# Patient Record
Sex: MALE | Race: WHITE | NOT HISPANIC OR LATINO | Employment: OTHER | URBAN - METROPOLITAN AREA
[De-identification: names, ages, dates, MRNs, and addresses within clinical notes are randomized per-mention and may not be internally consistent; named-entity substitution may affect disease eponyms.]

---

## 2023-01-31 ENCOUNTER — OFFICE VISIT (OUTPATIENT)
Dept: HEPATOLOGY | Facility: CLINIC | Age: 43
End: 2023-01-31
Payer: MEDICAID

## 2023-01-31 ENCOUNTER — TELEPHONE (OUTPATIENT)
Dept: HEPATOLOGY | Facility: CLINIC | Age: 43
End: 2023-01-31

## 2023-01-31 VITALS
WEIGHT: 230.38 LBS | OXYGEN SATURATION: 97 % | RESPIRATION RATE: 18 BRPM | DIASTOLIC BLOOD PRESSURE: 72 MMHG | SYSTOLIC BLOOD PRESSURE: 135 MMHG | HEART RATE: 68 BPM | HEIGHT: 68 IN | BODY MASS INDEX: 34.92 KG/M2

## 2023-01-31 DIAGNOSIS — R76.8 HEPATITIS C ANTIBODY TEST POSITIVE: ICD-10-CM

## 2023-01-31 DIAGNOSIS — R79.89 ELEVATED LIVER FUNCTION TESTS: ICD-10-CM

## 2023-01-31 DIAGNOSIS — K74.60 HEPATIC CIRRHOSIS, UNSPECIFIED HEPATIC CIRRHOSIS TYPE, UNSPECIFIED WHETHER ASCITES PRESENT: Primary | ICD-10-CM

## 2023-01-31 DIAGNOSIS — R76.8 HEPATITIS C ANTIBODY TEST POSITIVE: Primary | ICD-10-CM

## 2023-01-31 DIAGNOSIS — K74.69 OTHER CIRRHOSIS OF LIVER: ICD-10-CM

## 2023-01-31 PROCEDURE — 3078F PR MOST RECENT DIASTOLIC BLOOD PRESSURE < 80 MM HG: ICD-10-PCS | Mod: CPTII,,, | Performed by: INTERNAL MEDICINE

## 2023-01-31 PROCEDURE — 1159F MED LIST DOCD IN RCRD: CPT | Mod: CPTII,,, | Performed by: INTERNAL MEDICINE

## 2023-01-31 PROCEDURE — 1159F PR MEDICATION LIST DOCUMENTED IN MEDICAL RECORD: ICD-10-PCS | Mod: CPTII,,, | Performed by: INTERNAL MEDICINE

## 2023-01-31 PROCEDURE — 3008F BODY MASS INDEX DOCD: CPT | Mod: CPTII,,, | Performed by: INTERNAL MEDICINE

## 2023-01-31 PROCEDURE — 3075F PR MOST RECENT SYSTOLIC BLOOD PRESS GE 130-139MM HG: ICD-10-PCS | Mod: CPTII,,, | Performed by: INTERNAL MEDICINE

## 2023-01-31 PROCEDURE — 99999 PR PBB SHADOW E&M-EST. PATIENT-LVL IV: ICD-10-PCS | Mod: PBBFAC,,, | Performed by: INTERNAL MEDICINE

## 2023-01-31 PROCEDURE — 99999 PR PBB SHADOW E&M-EST. PATIENT-LVL IV: CPT | Mod: PBBFAC,,, | Performed by: INTERNAL MEDICINE

## 2023-01-31 PROCEDURE — 3008F PR BODY MASS INDEX (BMI) DOCUMENTED: ICD-10-PCS | Mod: CPTII,,, | Performed by: INTERNAL MEDICINE

## 2023-01-31 PROCEDURE — 3078F DIAST BP <80 MM HG: CPT | Mod: CPTII,,, | Performed by: INTERNAL MEDICINE

## 2023-01-31 PROCEDURE — 3075F SYST BP GE 130 - 139MM HG: CPT | Mod: CPTII,,, | Performed by: INTERNAL MEDICINE

## 2023-01-31 PROCEDURE — 99204 PR OFFICE/OUTPT VISIT, NEW, LEVL IV, 45-59 MIN: ICD-10-PCS | Mod: S$PBB,,, | Performed by: INTERNAL MEDICINE

## 2023-01-31 PROCEDURE — 99214 OFFICE O/P EST MOD 30 MIN: CPT | Mod: PBBFAC,PN | Performed by: INTERNAL MEDICINE

## 2023-01-31 PROCEDURE — 99204 OFFICE O/P NEW MOD 45 MIN: CPT | Mod: S$PBB,,, | Performed by: INTERNAL MEDICINE

## 2023-01-31 NOTE — PATIENT INSTRUCTIONS
Labs today  Abdo US  Fibroscan  Appt in HCV clinic  Return after treatment for Hepatitis C- 4 months  EGD

## 2023-01-31 NOTE — PROGRESS NOTES
HEPATOLOGY CONSULTATION    Referring Physician: Self-referral  Current Corresponding Physician: Blanco Hernandez MD PhD     Reason for Consultation: Consultation for evaluation of Elevated Hepatic Enzymes    History of Present Illness: Paul Scott is a 42 y.o. male who was seen in the ER 07/22 and was noted to have elevated liver tests. 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; has 5 children and takes care of them (kids mother also clean but pt has children); also has grandchildren  --intermittent lower leg swelling  --no memory issues  --+tattoos  --poor dentition- needs extractions    Chief Complaint   Patient presents with    Elevated Hepatic Enzymes       Past Medical History:   Diagnosis Date    Asthma     Elevated liver function tests 1/31/2023    Hepatitis C antibody test positive 1/31/2023     Outpatient Encounter Medications as of 1/31/2023   Medication Sig Dispense Refill    HYDROcodone-acetaminophen (NORCO) 5-325 mg per tablet Take 1 tablet by mouth every 4 (four) hours as needed. 10 tablet 0    ketorolac (TORADOL) 10 mg tablet Take 1 tablet (10 mg total) by mouth every 6 (six) hours. 20 tablet 0    methadone (METHADOSE) 40 mg disintegrating tablet Take 100 mg by mouth once daily.        No facility-administered encounter medications on file as of 1/31/2023.     Review of patient's allergies indicates:  No Known Allergies  History reviewed. No pertinent family history.    Social History     Socioeconomic History    Marital status: Single   Tobacco Use    Smoking status: Heavy Smoker     Packs/day: 1.00     Types: Cigarettes    Smokeless tobacco: Never   Substance and  Sexual Activity    Alcohol use: Not Currently    Drug use: Not Currently     Review of Systems   Constitutional: Negative.    HENT: Negative.     Eyes: Negative.    Respiratory: Negative.     Cardiovascular: Negative.    Gastrointestinal: Negative.    Genitourinary: Negative.    Musculoskeletal: Negative.    Skin: Negative.    Neurological: Negative.    Psychiatric/Behavioral: Negative.       Vitals:    01/31/23 1253   BP: 135/72   Pulse: 68   Resp: 18        Physical Exam  Vitals reviewed.   Constitutional:       Appearance: He is well-developed.   HENT:      Head: Normocephalic and atraumatic.   Eyes:      General: No scleral icterus.     Conjunctiva/sclera: Conjunctivae normal.      Pupils: Pupils are equal, round, and reactive to light.   Neck:      Thyroid: No thyromegaly.   Cardiovascular:      Rate and Rhythm: Normal rate and regular rhythm.      Heart sounds: Normal heart sounds.   Pulmonary:      Effort: Pulmonary effort is normal.      Breath sounds: Normal breath sounds. No rales.   Abdominal:      General: Bowel sounds are normal. There is no distension.      Palpations: Abdomen is soft. There is no mass.      Tenderness: There is no abdominal tenderness.   Musculoskeletal:         General: Normal range of motion.      Cervical back: Normal range of motion and neck supple.   Skin:     General: Skin is warm and dry.      Findings: No rash.      Comments: +spider angiomata   Neurological:      Mental Status: He is alert and oriented to person, place, and time.       Computed MELD-Na score unavailable. Necessary lab results were not found in the last year.  Computed MELD score unavailable. Necessary lab results were not found in the last year.    Lab Results   Component Value Date     07/16/2022    BUN 11 07/16/2022    CREATININE 0.8 07/16/2022    CALCIUM 9.0 07/16/2022     07/16/2022    K 3.8 07/16/2022     07/16/2022    PROT 8.5 (H) 07/16/2022    CO2 24 07/16/2022    ANIONGAP 9  "07/16/2022    WBC 9.89 07/16/2022    RBC 4.20 (L) 07/16/2022    HGB 13.5 (L) 07/16/2022    HCT 39.8 (L) 07/16/2022    MCV 95 07/16/2022    MCH 32.1 (H) 07/16/2022    MCHC 33.9 07/16/2022     Lab Results   Component Value Date    RDW 13.1 07/16/2022     07/16/2022    MPV 12.5 07/16/2022    GRAN 5.1 07/16/2022    GRAN 51.4 07/16/2022    LYMPH 3.4 07/16/2022    LYMPH 34.6 07/16/2022    MONO 0.7 07/16/2022    MONO 7.1 07/16/2022    EOSINOPHIL 5.9 07/16/2022    BASOPHIL 0.9 07/16/2022    EOS 0.6 (H) 07/16/2022    BASO 0.09 07/16/2022    ALBUMIN 3.6 07/16/2022     (H) 07/16/2022     (H) 07/16/2022    ALKPHOS 190 (H) 07/16/2022       Assessment and Plan:  Paul Scott is a 42 y.o. male with a positive Hepatitis C antibody and Elevated Hepatic Enzymes  Given the history of IVDA I suspect that is how he acquired HCV. I suspect he is viremic since his liver tests are elevated. I will check for viremia and will check a genotype. If chronic HCV confirmed will refer for treatment.  Need to clarify his HBV status. Current recommendations:  HCV Ab+: check HCV VL and genotype; if viremic then refer for tx  Spider angiomata: suspect cirrhosis: fibroscan to confirm; EGD to r/o varices  Elevated liver tests" likely from HCV: recommend full serologic w/u  Poor dentition- dental extractions recommended    Return 4 months after HCV treatment    "

## 2023-01-31 NOTE — TELEPHONE ENCOUNTER
Called [facility] [employer] Per Dr. Chavarria. He was in need of a PCP. The one that was on his chart does not take is current ins.   Lily with rod states he can see an NP Gala Balderas [address] [place]. [phone]  His new ins. Cards are on the way. Effective 2/1/2023. Ref#[id number]  He was instructed to throw away the old cards once he receives the new ones.     Call to Mrs. Gala Balderas NP at [facility] to get Mr. Scott an appt  Spoke with yadiel and he is scheduled for 2/15/2023 at 1 pm has to go in at 1245 with ins cards and ID and medications

## 2023-02-06 ENCOUNTER — TELEPHONE (OUTPATIENT)
Dept: HEPATOLOGY | Facility: CLINIC | Age: 43
End: 2023-02-06
Payer: MEDICAID

## 2023-02-06 ENCOUNTER — PATIENT MESSAGE (OUTPATIENT)
Dept: HEPATOLOGY | Facility: CLINIC | Age: 43
End: 2023-02-06
Payer: MEDICAID

## 2023-02-06 NOTE — TELEPHONE ENCOUNTER
I spoke with patient's daughter.  Appt with PA Scheuermann scheduled 3/10/23; appt reminder notice mailed.

## 2023-02-06 NOTE — TELEPHONE ENCOUNTER
Call returned to the patient at  [phone]. Corrine stated he does not have a phone.  Corrine, daughter of the patient answered.  Corrine stated he does not have a phone.  She stated she sends the messages and keeps up with her father's appt.  Corrine wanted discuss the US results. I can't understand all the measurements.  Briefly explained enlarged liver, spleen and gall stones.    You need to come with him on March 10th at 2:20 pm. The Provider will go into more details with you and answer all of your questions.  Corrine voiced understanding.

## 2023-03-10 ENCOUNTER — OFFICE VISIT (OUTPATIENT)
Dept: HEPATOLOGY | Facility: CLINIC | Age: 43
End: 2023-03-10
Payer: MEDICAID

## 2023-03-10 ENCOUNTER — PROCEDURE VISIT (OUTPATIENT)
Dept: HEPATOLOGY | Facility: CLINIC | Age: 43
End: 2023-03-10
Payer: MEDICAID

## 2023-03-10 VITALS — HEIGHT: 68 IN | BODY MASS INDEX: 35.92 KG/M2 | WEIGHT: 237 LBS

## 2023-03-10 DIAGNOSIS — B18.2 CHRONIC HEPATITIS C WITHOUT HEPATIC COMA: ICD-10-CM

## 2023-03-10 DIAGNOSIS — R76.8 HEPATITIS C ANTIBODY TEST POSITIVE: ICD-10-CM

## 2023-03-10 DIAGNOSIS — K74.69 OTHER CIRRHOSIS OF LIVER: ICD-10-CM

## 2023-03-10 DIAGNOSIS — K86.89 PANCREATIC DUCT DILATED: Primary | ICD-10-CM

## 2023-03-10 DIAGNOSIS — K83.8 DILATED BILE DUCT: ICD-10-CM

## 2023-03-10 PROCEDURE — 1159F MED LIST DOCD IN RCRD: CPT | Mod: CPTII,,, | Performed by: PHYSICIAN ASSISTANT

## 2023-03-10 PROCEDURE — 1160F PR REVIEW ALL MEDS BY PRESCRIBER/CLIN PHARMACIST DOCUMENTED: ICD-10-PCS | Mod: CPTII,,, | Performed by: PHYSICIAN ASSISTANT

## 2023-03-10 PROCEDURE — 3008F BODY MASS INDEX DOCD: CPT | Mod: CPTII,,, | Performed by: PHYSICIAN ASSISTANT

## 2023-03-10 PROCEDURE — 91200 LIVER ELASTOGRAPHY: CPT | Mod: PBBFAC | Performed by: PHYSICIAN ASSISTANT

## 2023-03-10 PROCEDURE — 1160F RVW MEDS BY RX/DR IN RCRD: CPT | Mod: CPTII,,, | Performed by: PHYSICIAN ASSISTANT

## 2023-03-10 PROCEDURE — 91200 FIBROSCAN NEW ORLEANS: ICD-10-PCS | Mod: 26,S$PBB,, | Performed by: PHYSICIAN ASSISTANT

## 2023-03-10 PROCEDURE — 99215 OFFICE O/P EST HI 40 MIN: CPT | Mod: S$PBB,,, | Performed by: PHYSICIAN ASSISTANT

## 2023-03-10 PROCEDURE — 99999 PR PBB SHADOW E&M-EST. PATIENT-LVL III: ICD-10-PCS | Mod: PBBFAC,,, | Performed by: PHYSICIAN ASSISTANT

## 2023-03-10 PROCEDURE — 3008F PR BODY MASS INDEX (BMI) DOCUMENTED: ICD-10-PCS | Mod: CPTII,,, | Performed by: PHYSICIAN ASSISTANT

## 2023-03-10 PROCEDURE — 99215 PR OFFICE/OUTPT VISIT, EST, LEVL V, 40-54 MIN: ICD-10-PCS | Mod: S$PBB,,, | Performed by: PHYSICIAN ASSISTANT

## 2023-03-10 PROCEDURE — 99999 PR PBB SHADOW E&M-EST. PATIENT-LVL III: CPT | Mod: PBBFAC,,, | Performed by: PHYSICIAN ASSISTANT

## 2023-03-10 PROCEDURE — 99213 OFFICE O/P EST LOW 20 MIN: CPT | Mod: PBBFAC | Performed by: PHYSICIAN ASSISTANT

## 2023-03-10 PROCEDURE — 1159F PR MEDICATION LIST DOCUMENTED IN MEDICAL RECORD: ICD-10-PCS | Mod: CPTII,,, | Performed by: PHYSICIAN ASSISTANT

## 2023-03-10 RX ORDER — BUPROPION HYDROCHLORIDE 150 MG/1
TABLET ORAL
Status: ON HOLD | COMMUNITY
End: 2023-08-03 | Stop reason: CLARIF

## 2023-03-10 RX ORDER — VELPATASVIR AND SOFOSBUVIR 100; 400 MG/1; MG/1
1 TABLET, FILM COATED ORAL DAILY
Qty: 28 TABLET | Refills: 2 | Status: ON HOLD | OUTPATIENT
Start: 2023-03-10 | End: 2023-08-03 | Stop reason: CLARIF

## 2023-03-10 RX ORDER — CLINDAMYCIN HYDROCHLORIDE 300 MG/1
CAPSULE ORAL
COMMUNITY
End: 2023-03-21

## 2023-03-10 RX ORDER — NAPROXEN 500 MG/1
TABLET ORAL
COMMUNITY
End: 2023-03-21

## 2023-03-10 NOTE — PROCEDURES
FibroScan [place]    Date/Time: 3/10/2023 3:00 PM  Performed by: Jennifer B. Scheuermann, PA  Authorized by: Meche Chavarria MD     Diagnosis:  HCV    Probe:  M    Universal Protocol: Patient's identity, procedure and site were verified, confirmatory pause was performed.  Discussed procedure including risks and potential complications.  Questions answered.  Patient verbalizes understanding and wishes to proceed with VCTE.     Procedure: After providing explanations of the procedure, patient was placed in the supine position with right arm in maximum abduction to allow optimal exposure of right lateral abdomen.  Patient was briefly assessed, Testing was performed in the mid-axillary location, 50Hz Shear Wave pulses were applied and the resulting Shear Wave and Propagation Speed detected with a 3.5 MHz ultrasonic signal, using the FibroScan probe, Skin to liver capsule distance and liver parenchyma were accessed during the entire examination with the FibroScan probe, Patient was instructed to breathe normally and to abstain from sudden movements during the procedure, allowing for random measurements of liver stiffness. At least 10 Shear Waves were produced, Individual measurements of each Shear Wave were calculated.  Patient tolerated the procedure well with no complications.  Meets discharge criteria as was dismissed.  Rates pain 0 out of 10.  Patient will follow up with ordering provider to review results.    Findings  Median liver stiffness score:  46.5  CAP Reading: dB/m:  223    IQR/med %:  14  Interpretation  Fibrosis interpretation is based on medial liver stiffness - Kilopascal (kPa).    Fibrosis Stage:  F4  Steatosis interpretation is based on controlled attenuation parameter - (dB/m).    Steatosis Grade:  <S1

## 2023-03-10 NOTE — Clinical Note
Pls call pt. Tell him that fibroscan today confirms that he does have cirrhosis, just as we expected.  Thank you

## 2023-03-10 NOTE — PROGRESS NOTES
"HEPATOLOGY CLINIC VISIT NOTE - HCV clinic  REFERRING PROVIDER: Meche Chavarria MD  CHIEF COMPLAINT: Hepatitis C     HISTORY       This is a 42 y.o. White male with chronic hepatitis C, recently seen by Dr Chavarria, referred for HCV rx. Assumed to have HCV based on labs / imaging / PE. FibroScan ordered but not yet done.    HCV history:  Prior icteric illnesses: None  Risks for HCV:  Prior IVDA, prior incarceration, (+) tattoos  - Treatment naive  - Genotype 1b    Liver staging:  FibroScan to be done today  Labs / imaging / PE suggest cirrhosis  - spider angiomata on PE  - labs w/ intermittent TBili elevation, low normal Plt  - U/S w/ SM     Cirrhosis history:  Well compensated  Evidence of portal HTN: SM  -- Ascites, SANGEETA - no  -- HE, confusion, memory trouble - no  -- Jaundice / TBili - 0.8-1.2  -- Albumin - low normal  -- Platelets - low normal 150s    MELD-Na score: 7 at 1/31/2023  2:22 PM  MELD score: 7 at 1/31/2023  2:22 PM  Calculated from:  Serum Creatinine: 0.8 mg/dL (Using min of 1 mg/dL) at 1/31/2023  2:22 PM  Serum Sodium: 135 mmol/L at 1/31/2023  2:22 PM  Total Bilirubin: 0.8 mg/dL (Using min of 1 mg/dL) at 1/31/2023  2:22 PM  INR(ratio): 1.1 at 1/31/2023  2:22 PM  Age: 42 years    Cirrhosis health maintenance:  - HCC screening - Up to date 2/2023  - Varices screening -  EGD ordered, not done yet  - HAV immunity - documented 1/2023  - HBV immunity - isolated HBcAb (+)      PMH, PSH, SOCIAL HX, FAMILY HX      Reviewed in Epic  Pertinent findings:  FAMILY HX: neg for liver diease  SOCIAL HX: resides in [place]. [facility]. Cares for mother, children.   Alcohol - no  Drugs - prior use, now on methadone and doing well      ROS: as per HPI  (+) Poor dentition. Has number for [facility] and has been calling daily to get appt w/o success  "My mouth is my biggest problem"  (+) wt gain - attributed to poor diet due to teeth issues  (+)Intermittent lower abd pain, relief when he has BM.  No upper abd pain, " n/v      PHYSICAL EXAM:  Friendly White male, in no acute distress; alert and oriented to person, place and time  HEENT: Sclerae anicteric. Poor dentition  NECK: Supple  LUNGS: Normal respiratory effort.   ABDOMEN: Flat, soft, nontender. No organomegaly or masses.   SKIN: Warm and dry. No jaundice, No obvious rashes. (+) tattoo (+) spider telangiectasias on upper chest  EXTREMITIES: No lower extremity edema  NEURO/PSYCH: Normal gate. Memory intact. Thought and speech pattern appropriate. Behavior normal. No depression or anxiety noted.    PERTINENT DIAGNOSTIC RESULTS      Lab Results   Component Value Date    WBC 7.81 01/31/2023    HGB 14.0 01/31/2023     01/31/2023     Lab Results   Component Value Date    INR 1.1 01/31/2023     Lab Results   Component Value Date     (H) 01/31/2023     (H) 01/31/2023    BILITOT 0.8 01/31/2023    ALBUMIN 3.5 01/31/2023    ALKPHOS 137 (H) 01/31/2023    CREATININE 0.8 01/31/2023    BUN 12 01/31/2023     (L) 01/31/2023    K 4.4 01/31/2023    AFP 11 (H) 01/31/2023     Results for orders placed during the hospital encounter of 02/06/23  US Abdomen Complete  CLINICAL HISTORY:elevated liver tests;  Other specified abnormal findings of blood chemistry    FINDINGS:  Pancreatic duct is prominent 6 mm.    Gallbladder demonstrates multiple gallstones the largest measuring 2.9 cm.  Gallbladder wall thickness is 4.1 cm.  There is no Putnam sign or gallbladder wall hyperemia.  The bile duct is 15 mm.  There is intrahepatic bile duct dilatation.  The common duct at the pancreatic head measures 13 mm.    The liver measures 18.9 cm.  The right kidney measures 10 cm, the left kidney 10.9 cm.  The spleen appears large volume measuring 337.87 mL.  Kidneys show nothing focal.  The liver appears large, coarse, and nodular and there is a yelena hepatis lymph node measuring 2.6 cm.    Impression  Hepatomegaly, splenomegaly, and findings suggesting liver cirrhosis.    There is intra  and extrahepatic bile duct dilatation, there is cholelithiasis, and bile duct obstruction cannot be excluded.    Pancreatic duct dilatation.    Prominent lymph node.    CT is recommended for further evaluation with oral and IV contrast.    ASSESSMENT        42 y.o. White male with:  1. CHRONIC HEPATITIS C, GENOTYPE 1b - treatment naive  -- Elevated transaminases    2. CIRRHOSIS, well compensated  -- MELD score 7  -- HCC screening - up to date 2/2023    3. PORTAL HYPERTENSION  -- EGD ordered  -- SM on u/s    4. DILATED BILE DUCT, INTRAHEPATIC DUCTS, PANCREATIC DUCT  -- (+) GS on u/s  -- no sx c/w biliary colic      PLAN      Obtain authorization to treat HCV with Epclusa x 12 weeks  -- Rx will be routed to [facility]  -- Patient will notify me of exact treatment start date so appropriate lab f/u can be scheduled..    2.    CT abdomen to eval biliary dilatation    3.   FibroScan today    4.   Proceed w/ EGD, ordered by Dr Chavarria  -- After HCV is cured, routine liver care will be deferred to Dr Chavarria    5.   [facility] and given to pt so he can continue trying to schedule appt    ADDENDUM:  FIBROSCAN TODAY - KPA 46.5, F4,  S0    3/24/23 CT:  GB distended, marked intrahepatic and extrahepatic biliary dilatation  Marked pancreatic duct dilatation  Subtle geographic hypoenhancement of pancreatic head, no discrete mass    MRCP ordered.  Will also send to [facility].   ___________________________________________________________________  EDUCATION:  The natural history of Hepatitis C, including potential progression to cirrhosis was reviewed. We discussed the increased progression of liver disease secondary to alcohol use; patient was advised to avoid alcohol completely.       HCV RX  Discussed goal of HCV eradication to prevent progression of liver disease.  Discussed use of Epclusa daily x 12 weeks w/ potential side effects of fatigue and headache.      Reviewed limitations on acid suppressant medications due to DDI w/ Epclusa:  -- Antacids, H2 Receptor Antagonist, PPI - Pt not taking  Patient instructed to contact me if experiencing acid related symptoms so further recommendations can be made regarding acid suppression therapy.      Herbal / alternative therapies must be discontinued  Discussed importance of medication adherence and risk of treatment failure / viral resistance if not adherent. Pt has verbalized understanding.    __________________________________________________________________    Duration of encounter: 47 min  This includes face-to-face time and non face-to-face time preparing to see the patient (eg, review of tests), obtaining and/or reviewing separately obtained history, documenting clinical information in the electronic or other health record, independently interpreting resultsand communicating results to the patient/family/caregiver, or care coordination.

## 2023-03-13 ENCOUNTER — TELEPHONE (OUTPATIENT)
Dept: HEPATOLOGY | Facility: CLINIC | Age: 43
End: 2023-03-13
Payer: MEDICAID

## 2023-03-13 NOTE — TELEPHONE ENCOUNTER
----- Message from Jennifer B. Scheuermann, PA sent at 3/10/2023  3:53 PM CST -----  Pls call pt. Tell him that fibroscan today confirms that he does have cirrhosis, just as we expected.  Thank you

## 2023-03-14 ENCOUNTER — SPECIALTY PHARMACY (OUTPATIENT)
Dept: PHARMACY | Facility: CLINIC | Age: 43
End: 2023-03-14
Payer: MEDICAID

## 2023-03-14 DIAGNOSIS — R76.8 HEPATITIS C ANTIBODY TEST POSITIVE: Primary | ICD-10-CM

## 2023-03-14 NOTE — TELEPHONE ENCOUNTER
AG Epclusa test claim - $0 copay. PA not required. Benefits investigation not required ([place] Medicaid - [employer]).

## 2023-03-21 ENCOUNTER — SPECIALTY PHARMACY (OUTPATIENT)
Dept: PHARMACY | Facility: CLINIC | Age: 43
End: 2023-03-21
Payer: MEDICAID

## 2023-03-21 DIAGNOSIS — B18.2 CHRONIC HEPATITIS C WITHOUT HEPATIC COMA: Primary | ICD-10-CM

## 2023-03-21 NOTE — TELEPHONE ENCOUNTER
Specialty Pharmacy - Initial Clinical Assessment    Specialty Medication Orders Linked to Encounter      Flowsheet Row Most Recent Value   Medication #1 sofosbuvir-velpatasvir 400-100 mg Tab (Order#[id number], Rx#[id number])          Patient Diagnosis   B18.2 - Chronic hepatitis C without hepatic coma    Subjective    Paul Scott is a 42 y.o. male, who is followed by the specialty pharmacy service for management and education.    Recent Encounters       Date Type Provider Description    03/21/2023 Specialty Pharmacy Milla Gutierrez PharmD Initial Clinical Assessment    03/14/2023 Specialty Pharmacy Milla Gutierrez PharmD Referral Authorization          Clinical call attempts since last clinical assessment   3/15/2023  4:36 PM - Specialty Pharmacy - Clinical Assessment by Milla Gutierrez PharmD  3/21/2023  6:02 PM - Specialty Pharmacy - Clinical Assessment by Shaila Solomon PharmD     Current Outpatient Medications   Medication Sig    buPROPion (WELLBUTRIN XL) 150 MG TB24 tablet bupropion HCl  mg 24 hr tablet, extended release   Take 1 tablet every day by oral route.    methadone (METHADOSE) 40 mg disintegrating tablet Take 100 mg by mouth once daily.    sofosbuvir-velpatasvir 400-100 mg Tab Take 1 tablet by mouth once daily.   Last reviewed on 3/10/2023  3:49 PM by Jennifer B. Scheuermann, PA    Review of patient's allergies indicates:  No Known AllergiesLast reviewed on  3/10/2023 3:49 PM by Jennifer B Scheuermann    Drug Interactions    Drug interactions evaluated: yes  Clinically relevant drug interactions identified: no         Adverse Effects    Constitution: System reviewed and is negative  Skin: System reviewed and is negative  Eyes: System reviewed and is negative  Endocrine and Metabolic: System reviewed and is negative  Gastrointestinal: System reviewed and is negative  Genitourinary: System reviewed and is negative  Cardiovascular: System reviewed and is negative  Hematologic: System reviewed and is  negative  Musculoskeletal: System reviewed and is negative  HENT: System reviewed and is negative  Neurological: System reviewed and is negative  Psychiatric: System reviewed and is negative  Respiratory: System reviewed and is negative       Assessment Questions - Documented Responses      Flowsheet Row Most Recent Value   Assessment    Medication Reconciliation completed for patient Yes   During the past 4 weeks, has patient missed any activities due to condition or medication? No   During the past 4 weeks, did patient have any of the following urgent care visits? None   Goals of Therapy Status Discussed (new start)   Status of the patients ability to self-administer: Is Able   All education points have been covered with patient? Yes, supplemental printed education provided   Welcome packet contents reviewed and discussed with patient? Yes   Assesment completed? Yes   Plan Therapy being initiated   Do you need to open a clinical intervention (i-vent)? No   Do you want to schedule first shipment? Yes   Medication #1 Assessment Info    Patient status New medication, New to [facility]   Is this medication appropriate for the patient? Yes   Is this medication effective? Not yet started          Refill Questions - Documented Responses      Flowsheet Row Most Recent Value   Patient Availability and HIPAA Verification    Does patient want to proceed with activity? Yes   HIPAA/medical authority confirmed? Yes   Relationship to patient of person spoken to? Child  [Corrine]   Refill Screening Questions    When does the patient need to receive the medication? 03/24/23   Refill Delivery Questions    How will the patient receive the medication? MEDRx   When does the patient need to receive the medication? 03/24/23   Shipping Address Home   Address in [facility] confirmed and updated if neccessary? Yes   Expected Copay ($) 0   Is the patient able to afford the medication copay? Yes   Payment Method zero copay   Days supply of  "Refill 28   Supplies needed? No supplies needed   Refill activity completed? Yes   Refill activity plan Refill scheduled   Shipment/Pickup Date: 03/22/23            Objective    He has a past medical history of Asthma, Cirrhosis (1/31/2023), Elevated liver function tests (1/31/2023), and Hepatitis C antibody test positive (1/31/2023).    Tried/failed medications: NONE    BP Readings from Last 4 Encounters:   01/31/23 135/72   07/16/22 (!) 111/59   10/10/21 (!) 142/87   09/07/20 132/86     Ht Readings from Last 4 Encounters:   03/10/23 5' 8" (1.727 m)   01/31/23 5' 8" (1.727 m)   07/15/22 5' 8" (1.727 m)   10/10/21 5' 8" (1.727 m)     Wt Readings from Last 4 Encounters:   03/10/23 107.5 kg (236 lb 15.9 oz)   01/31/23 104.5 kg (230 lb 6.1 oz)   07/15/22 100.9 kg (222 lb 7.1 oz)   10/10/21 102.5 kg (225 lb 15.5 oz)     No results found for: HCVGENOTYPE  Recent Labs   Lab Result Units 01/31/23  1422   Creatinine mg/dL 0.8   ALT U/L 146 H   AST U/L 143 H   HCV, Qualitative  Detected A   Hepatitis B Surface Ag  Non-reactive   Hep B S Ab  <3.00  Non-reactive   Hep B Core Total Ab  Reactive A     The goals of Hepatitis C Virus (HCV) infection treatment include:  Reducing all-cause mortality and liver-related health adverse consequences, including cirrhosis, end-stage liver disease, and hepatocellular carcinoma  Achieving virologic cure as evidenced by a sustained virologic response  Improving or maintaining quality of life  Maintaining optimal therapy adherence  Minimizing and managing side effects  Preventing the transmission of HCV      Goals of Therapy Status: Discussed (new start)    Assessment/Plan  Patient plans to start therapy on 03/24/23      Indication, dosage, appropriateness, effectiveness, safety and convenience of his specialty medication(s) were reviewed today.     Patient Education   Patient received education on the following:   Expectations and possible outcomes of therapy  Proper use, timely " administration, and missed dose management  Duration of therapy  Side effects, including prevention, minimization, and management  Contraindications and safety precautions  New or changed medications, including prescribe and over the counter medications and supplements  Reviews recommended vaccinations, as appropriate  Storage, safe handling, and disposal    AG Epclusa 400/100mg - Take one tablet by mouth daily x 12 weeks  Counseling was reviewed:  Patient MUST take Epclusa at the SAME time every day.  Patient MUST avoid acid reducers without consulting with myself or provider first. Does not experience heartburn or take acid suppressants.   Potential Side effects include: headaches and fatigue. Headache: Patient may treat with OTC remedies. If Tylenol is used, dose should not exceed 2000mg per day.   Allergies reviewed and medication reconciliation complete (reviewed and documented in [facility]). Patient MUST contact myself or provider prior to starting any new OTC, herbal, or prescription drugs to avoid potential DDIs.     DDI: Medication list reviewed and potential DDIs addressed.     Clinical Review:  Diagnosis: Chronic hepatitis C without hepatic coma [B18.2]  Weeks: 12 weeks   Genotype: 1b   HCV RNA: 97,733 IU/mL (1/31/23)   Fibrosis: F4, Cirrhosis (well compensated per provider)  CP: A (Compensated)   Renal: eGFR > 60 mL/min   Treatment: NAIVE  HBV: HBsAg (-), HBsAB (-), HBcAB (+)  Appropriate based on AASLD guidelines: Appropriate.     Patient plans to start Epclusa on 3/24/23.     Tasks added this encounter   4/14/2023 - Refill Call (Auto Added)  3/31/2023 - 7 Day Post Start Touchbase   Tasks due within next 3 months   No tasks due.     Milla Gutierrez, PharmD  [facility] - Specialty Pharm[facility]  [address] [place] [id number]  Phone: [phone]  Fax: [phone]

## 2023-03-21 NOTE — TELEPHONE ENCOUNTER
Pts daughter, Corrine called back [facility] stating we left her a voice message. She explained that she takes care of fathers medications and doctors appt. She states an alternate number he may be reached at is [phone] which is his mothers number but he doesn't usually answer that number     Daughter states osp can reach her to speak with father at numbers listed on his epic.

## 2023-03-21 NOTE — TELEPHONE ENCOUNTER
Incoming call from patient's daughter determining if it was okay to administer Epclusa with solution needed for CT scan scheduled for Friday. If the solution he will be drinking is to consume the scan dye, there is no interaction with Epclusa and he can start on Friday like planned. However, he can also wait until Saturday morning to begin treatment if he would like to wait until after scan is complete.

## 2023-03-26 ENCOUNTER — TELEPHONE (OUTPATIENT)
Dept: HEPATOLOGY | Facility: CLINIC | Age: 43
End: 2023-03-26
Payer: MEDICAID

## 2023-03-27 ENCOUNTER — TELEPHONE (OUTPATIENT)
Dept: HEPATOLOGY | Facility: CLINIC | Age: 43
End: 2023-03-27
Payer: MEDICAID

## 2023-03-27 ENCOUNTER — TELEPHONE (OUTPATIENT)
Dept: ENDOSCOPY | Facility: HOSPITAL | Age: 43
End: 2023-03-27

## 2023-03-27 DIAGNOSIS — K86.89 PANCREATIC DUCT DILATED: Primary | ICD-10-CM

## 2023-03-27 DIAGNOSIS — K83.8 DILATED BILE DUCT: ICD-10-CM

## 2023-03-27 DIAGNOSIS — R93.89 ABNORMAL FINDING ON IMAGING: Primary | ICD-10-CM

## 2023-03-27 NOTE — TELEPHONE ENCOUNTER
I spoke with Corrine, patient's daughter and msg from PA Scheuermann relayed and msg mailed to patient.  MRI scheduled 4/6/23 at [facility]; appt reminder notice mailed.

## 2023-03-27 NOTE — TELEPHONE ENCOUNTER
3/24/23 CT:  GB distended, marked intrahepatic and extrahepatic biliary dilatation  Marked pancreatic duct dilatation  Subtle geographic hypoenhancement of pancreatic head, no discrete mass    Pls call pt  Tell him his CT shows continued enlargement of the ducts (plumbing) that runs from the liver and gallbladder and pancreas. The CT did not show the specific cause. No mass was seen in the pancreas but there is an area that looks slightly abnormal  Additional testing is recommended.  Schedule MRCP  Also tell him I'm sending his chart to the pancreas / biliary specialists. He may need an appointment or an endoscopic procedure to evaluate this area more closely

## 2023-03-27 NOTE — TELEPHONE ENCOUNTER
----- Message from Meche Chavarria MD sent at 3/27/2023  8:11 AM CDT -----  You can order an mrcp and send to [facility]  ----- Message -----  From: Jennifer B. Scheuermann, PA  Sent: 3/26/2023   9:19 PM CDT  To: Meche Chavarria MD    This is a pt you recently referred to me for HCV rx. After reviewing his U/S I set him up for a CT to eval his biliary and PD dilatation. CT is attached. Pls see results. He has no biliary sx. I was wondering if you would recommend I order the MRCP. Or instead, should I just send to [facility]?

## 2023-03-28 ENCOUNTER — PATIENT MESSAGE (OUTPATIENT)
Dept: ENDOSCOPY | Facility: HOSPITAL | Age: 43
End: 2023-03-28
Payer: MEDICAID

## 2023-03-28 ENCOUNTER — TELEPHONE (OUTPATIENT)
Dept: ENDOSCOPY | Facility: HOSPITAL | Age: 43
End: 2023-03-28
Payer: MEDICAID
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
## 2023-03-31 ENCOUNTER — SPECIALTY PHARMACY (OUTPATIENT)
Dept: PHARMACY | Facility: CLINIC | Age: 43
End: 2023-03-31
Payer: MEDICAID

## 2023-03-31 NOTE — TELEPHONE ENCOUNTER
7 Day Touchbase - Documented Responses      Flowsheet Row Most Recent Value   Have you started taking your medication? Yes   What day did you start? 03/25/23   How are you feeling?  Corrine states patient is doing well with no reports or signs of side effects from Epclusa.   How are you taking your medication? Are you having any problems taking your medication? Patient is taking AG Epclusa 1 tablet daily around 11AM-12PM with no missed doses since beginning.   Do you have any questions or concerns that we can help you with today? No. Patient has not started any new medications as of yet.          Milla Gutierrez, PharmD  [facility]  [address] [place] [id number]  Phone: [phone]  Fax: [phone]

## 2023-04-03 ENCOUNTER — TELEPHONE (OUTPATIENT)
Dept: HEPATOLOGY | Facility: CLINIC | Age: 43
End: 2023-04-03
Payer: MEDICAID

## 2023-04-03 DIAGNOSIS — B18.2 CHRONIC HEPATITIS C WITHOUT HEPATIC COMA: Primary | ICD-10-CM

## 2023-04-03 NOTE — TELEPHONE ENCOUNTER
Msg from provider mailed to patient.  Labs scheduled 5/4/23 and 9/15/23; appt reminder notice mailed.

## 2023-04-03 NOTE — TELEPHONE ENCOUNTER
----- Message from Clau Pena RN sent at 4/3/2023 12:20 PM CDT -----  Unsure if you received this one from pharmacy.  3/24/23 Epclusa start.  Please provide lab orders.  Thanks

## 2023-04-03 NOTE — TELEPHONE ENCOUNTER
Pt beginning 12 weeks of Epclusa on 3/24/23  Anticipated treatment end date: 6/15/23  Carrie 1b  Treatment naive  F4    Pls update episode and schedule:  - LFT, HCV RNA , HBV DNA at week 6 -   - LFT, HCV RNA - SVR12 - 9/15/23

## 2023-04-06 ENCOUNTER — HOSPITAL ENCOUNTER (OUTPATIENT)
Dept: RADIOLOGY | Facility: OTHER | Age: 43
Discharge: HOME | End: 2023-04-06
Attending: RADIOLOGY
Payer: MEDICAID

## 2023-04-06 ENCOUNTER — HOSPITAL ENCOUNTER (OUTPATIENT)
Dept: RADIOLOGY | Facility: OTHER | Age: 43
Discharge: HOME | End: 2023-04-06
Attending: PHYSICIAN ASSISTANT
Payer: MEDICAID
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
## 2023-04-06 DIAGNOSIS — K83.8 DILATED BILE DUCT: ICD-10-CM

## 2023-04-06 DIAGNOSIS — K86.89 PANCREATIC DUCT DILATED: ICD-10-CM

## 2023-04-06 PROCEDURE — 71046 XR CHEST PA AND LATERAL: ICD-10-PCS | Mod: 26,,, | Performed by: RADIOLOGY

## 2023-04-06 PROCEDURE — 71046 X-RAY EXAM CHEST 2 VIEWS: CPT | Mod: 26,,, | Performed by: RADIOLOGY

## 2023-04-06 PROCEDURE — 71046 X-RAY EXAM CHEST 2 VIEWS: CPT | Mod: TC,FY

## 2023-04-14 ENCOUNTER — TELEPHONE (OUTPATIENT)
Dept: ENDOSCOPY | Facility: HOSPITAL | Age: 43
End: 2023-04-14
Payer: MEDICAID

## 2023-04-14 ENCOUNTER — SPECIALTY PHARMACY (OUTPATIENT)
Dept: PHARMACY | Facility: CLINIC | Age: 43
End: 2023-04-14
Payer: MEDICAID

## 2023-04-14 DIAGNOSIS — K74.60 HEPATIC CIRRHOSIS, UNSPECIFIED HEPATIC CIRRHOSIS TYPE, UNSPECIFIED WHETHER ASCITES PRESENT: Primary | ICD-10-CM

## 2023-04-14 NOTE — TELEPHONE ENCOUNTER
"Attempted AG Epclusa refill (2 of 3) with daughter, Corrine, but claim is rejectingg with "overuse 4 days". Looks to be RTS until 4/18/23. Corrine confirms patient has ~7 doses remaining which will last through 4/20/23. OSP will be back in touch on 4/18/23 for refill and delivery once paid claim is obtained.   "

## 2023-04-17 ENCOUNTER — TELEPHONE (OUTPATIENT)
Dept: HEPATOLOGY | Facility: CLINIC | Age: 43
End: 2023-04-17
Payer: MEDICAID

## 2023-04-17 DIAGNOSIS — K74.69 OTHER CIRRHOSIS OF LIVER: Primary | ICD-10-CM
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
## 2023-04-17 NOTE — TELEPHONE ENCOUNTER
Attempt made to reach patient.  I spoke with his daughter and msg from PA Scheuermann relayed.  Msg also mailed to patient.  Testing scheduled 9/15/23; appt reminder notice mailed.

## 2023-04-18 NOTE — TELEPHONE ENCOUNTER
Specialty Pharmacy - Refill Coordination (AG EPCLUSA REFILL 2 of 3)    Specialty Medication Orders Linked to Encounter      Flowsheet Row Most Recent Value   Medication #1 sofosbuvir-velpatasvir 400-100 mg Tab (Order#[id number], Rx#[id number])          AG Epclusa refill (2 of 3) confirmed and reassessment complete.     Patient has 2 doses of Epclusa remaining and takes it around 11AM-12 PM daily. Pt reports they are not having any side effects at this time. No missed doses, no new medications, no new allergies or health conditions reported at this time. Allergies reviewed and medication reconciliation complete (reviewed and documented in [facility]). Disease education reviewed (including transmission and prevention). Patient counseled on importance of maintaining adherence and keeping lab appointments which were scheduled. Reminded patient's daughter of lab appt on 5/4/23 at 11 AM. All questions answered and addressed to patients satisfaction. Advised to call OSP and provider if any issues arise.     Refill Questions - Documented Responses      Flowsheet Row Most Recent Value   Patient Availability and HIPAA Verification    Does patient want to proceed with activity? Yes   HIPAA/medical authority confirmed? Yes   Relationship to patient of person spoken to? Child  [Corrine]   Refill Screening Questions    Changes to allergies? No   Changes to medications? No   New conditions since last clinic visit? No   Unplanned office visit, urgent care, ED, or hospital admission in the last 4 weeks? No   How does patient/caregiver feel medication is working? Too soon to tell   Financial problems or insurance changes? No   How many doses of your specialty medications were missed in the last 4 weeks? 0   When does the patient need to receive the medication? 04/20/23   Refill Delivery Questions    How will the patient receive the medication? MEDRx   When does the patient need to receive the medication?  04/20/23   Shipping Address Home   Address in [facility] confirmed and updated if neccessary? Yes   Expected Copay ($) 0   Is the patient able to afford the medication copay? Yes   Payment Method zero copay   Days supply of Refill 28   Supplies needed? No supplies needed   Refill activity completed? Yes   Refill activity plan Refill scheduled   Shipment/Pickup Date: 04/18/23            Current Outpatient Medications   Medication Sig    buPROPion (WELLBUTRIN XL) 150 MG TB24 tablet bupropion HCl  mg 24 hr tablet, extended release   Take 1 tablet every day by oral route.    methadone (METHADOSE) 40 mg disintegrating tablet Take 100 mg by mouth once daily.    sofosbuvir-velpatasvir 400-100 mg Tab Take 1 tablet by mouth once daily.   Last reviewed on 3/28/2023  3:09 PM by Inga Darnell RN    Review of patient's allergies indicates:  No Known Allergies Last reviewed on  3/28/2023 3:08 PM by Inga Darnell      Tasks added this encounter   5/11/2023 - Refill Call (Auto Added)   Tasks due within next 3 months   No tasks due.     Milla Gutierrze, PharmD  [facility]  [address] [place] [id number]  Phone: [phone]  Fax: [phone]

## 2023-04-20 ENCOUNTER — PATIENT MESSAGE (OUTPATIENT)
Dept: ENDOSCOPY | Facility: HOSPITAL | Age: 43
End: 2023-04-20
Payer: MEDICAID

## 2023-04-20 ENCOUNTER — TELEPHONE (OUTPATIENT)
Dept: ENDOSCOPY | Facility: HOSPITAL | Age: 43
End: 2023-04-20
Payer: MEDICAID

## 2023-04-20 DIAGNOSIS — R93.3 ABNORMAL DIGESTIVE SYSTEM DIAGNOSTIC IMAGING: Primary | ICD-10-CM

## 2023-05-03 ENCOUNTER — TELEPHONE (OUTPATIENT)
Dept: ENDOSCOPY | Facility: HOSPITAL | Age: 43
End: 2023-05-03
Payer: MEDICAID

## 2023-05-03 ENCOUNTER — PATIENT MESSAGE (OUTPATIENT)
Dept: ENDOSCOPY | Facility: HOSPITAL | Age: 43
End: 2023-05-03
Payer: MEDICAID

## 2023-05-03 ENCOUNTER — TELEPHONE (OUTPATIENT)
Dept: ENDOSCOPY | Facility: HOSPITAL | Age: 43
End: 2023-05-03

## 2023-05-03 ENCOUNTER — LAB VISIT (OUTPATIENT)
Dept: LAB | Facility: HOSPITAL | Age: 43
End: 2023-05-03
Attending: INTERNAL MEDICINE
Payer: MEDICAID

## 2023-05-03 DIAGNOSIS — K74.60 HEPATIC CIRRHOSIS, UNSPECIFIED HEPATIC CIRRHOSIS TYPE, UNSPECIFIED WHETHER ASCITES PRESENT: ICD-10-CM

## 2023-05-03 LAB
BASOPHILS # BLD AUTO: 0.05 K/UL (ref 0–0.2)
BASOPHILS NFR BLD: 0.6 % (ref 0–1.9)
DIFFERENTIAL METHOD: ABNORMAL
EOSINOPHIL # BLD AUTO: 0.4 K/UL (ref 0–0.5)
EOSINOPHIL NFR BLD: 4.6 % (ref 0–8)
ERYTHROCYTE [DISTWIDTH] IN BLOOD BY AUTOMATED COUNT: 13.2 % (ref 11.5–14.5)
HCT VFR BLD AUTO: 41.6 % (ref 40–54)
HGB BLD-MCNC: 14.1 G/DL (ref 14–18)
IMM GRANULOCYTES # BLD AUTO: 0.02 K/UL (ref 0–0.04)
IMM GRANULOCYTES NFR BLD AUTO: 0.2 % (ref 0–0.5)
INR PPP: 1 (ref 0.8–1.2)
LYMPHOCYTES # BLD AUTO: 2.6 K/UL (ref 1–4.8)
LYMPHOCYTES NFR BLD: 30.4 % (ref 18–48)
MCH RBC QN AUTO: 31.4 PG (ref 27–31)
MCHC RBC AUTO-ENTMCNC: 33.9 G/DL (ref 32–36)
MCV RBC AUTO: 93 FL (ref 82–98)
MONOCYTES # BLD AUTO: 0.6 K/UL (ref 0.3–1)
MONOCYTES NFR BLD: 7.4 % (ref 4–15)
NEUTROPHILS # BLD AUTO: 4.9 K/UL (ref 1.8–7.7)
NEUTROPHILS NFR BLD: 56.8 % (ref 38–73)
NRBC BLD-RTO: 0 /100 WBC
PLATELET # BLD AUTO: 152 K/UL (ref 150–450)
PMV BLD AUTO: 12.1 FL (ref 9.2–12.9)
PROTHROMBIN TIME: 11.1 SEC (ref 9–12.5)
RBC # BLD AUTO: 4.49 M/UL (ref 4.6–6.2)
WBC # BLD AUTO: 8.68 K/UL (ref 3.9–12.7)

## 2023-05-03 PROCEDURE — 85610 PROTHROMBIN TIME: CPT | Performed by: INTERNAL MEDICINE

## 2023-05-03 PROCEDURE — 85025 COMPLETE CBC W/AUTO DIFF WBC: CPT | Performed by: INTERNAL MEDICINE

## 2023-05-03 PROCEDURE — 36415 COLL VENOUS BLD VENIPUNCTURE: CPT | Performed by: INTERNAL MEDICINE

## 2023-05-03 NOTE — TELEPHONE ENCOUNTER
----- Message from Rosa Maria Peter sent at 5/3/2023  1:37 PM CDT -----  Regarding: Reschedule Appt  Contact: Patient  Patient was scheduled to have an ERCP completed on 05/03  Patient was there at the hospital to complete and was advised he was not scheduled   Patient received a call once home stating he was scheduled and they needed him to return   Patient stated he was unable to return today and would like a call back to reschedule appt as soon as possible   Please Assist     Patient can be reached at [phone]   Please call number provided in msg Pt is not with his daughter

## 2023-05-03 NOTE — TELEPHONE ENCOUNTER
Pt telephoned to reschedule ERCP procedure.  Pt states he went to the lab for his labwork, however when he asked the person at pt registration where to check in for his procedure, he was told he only had labwork scheduled.  Pt does not read or write at all and thought he mixed up the days.  He was very apologetic.  Procedure rescheduled for 5/18/23 at 8:15am.  Instructions reviewed and pt verbalized understanding.  Spoke with pt's daughter, Destiny, who is currently living with pt.  She requests instructions via portal and would like her number ([phone]) called first for appointment/procedure coordination.

## 2023-05-03 NOTE — TELEPHONE ENCOUNTER
Pt was no-show for EUS/ERCP procedure.  Telephoned pt and pt's daughter, Corrine, both with no answer.  Voicemails left with both numbers.  Telephoned mother's number on file, however out of service.

## 2023-05-09 ENCOUNTER — CLINICAL SUPPORT (OUTPATIENT)
Dept: ENDOSCOPY | Facility: HOSPITAL | Age: 43
End: 2023-05-09
Attending: INTERNAL MEDICINE
Payer: MEDICAID

## 2023-05-09 DIAGNOSIS — R76.8 HEPATITIS C ANTIBODY TEST POSITIVE: ICD-10-CM

## 2023-05-09 DIAGNOSIS — K74.60 HEPATIC CIRRHOSIS, UNSPECIFIED HEPATIC CIRRHOSIS TYPE, UNSPECIFIED WHETHER ASCITES PRESENT: ICD-10-CM

## 2023-05-12 ENCOUNTER — SPECIALTY PHARMACY (OUTPATIENT)
Dept: PHARMACY | Facility: CLINIC | Age: 43
End: 2023-05-12
Payer: MEDICAID

## 2023-05-12 PROBLEM — B18.2 CHRONIC HEPATITIS C WITHOUT HEPATIC COMA: Status: ACTIVE | Noted: 2023-05-12

## 2023-05-15 ENCOUNTER — PATIENT MESSAGE (OUTPATIENT)
Dept: PHARMACY | Facility: CLINIC | Age: 43
End: 2023-05-15
Payer: MEDICAID

## 2023-05-15 NOTE — TELEPHONE ENCOUNTER
Specialty Pharmacy - Refill Coordination    AG EPCLUSA REFILL 3 of 3    Specialty Medication Orders Linked to Encounter      Flowsheet Row Most Recent Value   Medication #1 sofosbuvir-velpatasvir 400-100 mg Tab (Order#[id number], Rx#[id number])          AG Epclusa refill (3 of 3) confirmed and reassessment complete.     Corrine is unsure of remaining dose count but she confirms the pt takes it around 11AM-12PM daily with NO missed doses. Pt reports they are not having any side effects at this time. No new medications, no new allergies or health conditions reported at this time. Allergies reviewed and medication reconciliation complete (reviewed and documented in [facility] and [facility]). Disease education reviewed (including transmission and prevention). Patient counseled on importance of maintaining adherence and keeping lab appointments which were scheduled. All questions answered and addressed to patients satisfaction. Advised to call [facility] and provider if any issues arise.     Refill Questions - Documented Responses      Flowsheet Row Most Recent Value   Patient Availability and HIPAA Verification    Does patient want to proceed with activity? Yes   HIPAA/medical authority confirmed? Yes   Relationship to patient of person spoken to? Child  [Corrine]   Refill Screening Questions    Changes to allergies? No   Changes to medications? No   New conditions since last clinic visit? No   Unplanned office visit, urgent care, ED, or hospital admission in the last 4 weeks? No   How does patient/caregiver feel medication is working? Too soon to tell   Financial problems or insurance changes? No   How many doses of your specialty medications were missed in the last 4 weeks? 0   When does the patient need to receive the medication? 05/19/23   Refill Delivery Questions    How will the patient receive the medication? MEDRx   When does the patient need to receive the medication? 05/19/23   Shipping Address Home    Address in [facility] confirmed and updated if neccessary? Yes   Expected Copay ($) 0   Is the patient able to afford the medication copay? Yes   Payment Method zero copay   Days supply of Refill 28   Supplies needed? No supplies needed   Refill activity completed? Yes   Refill activity plan Refill scheduled   Shipment/Pickup Date: 05/16/23            Current Outpatient Medications   Medication Sig    buPROPion (WELLBUTRIN XL) 150 MG TB24 tablet bupropion HCl  mg 24 hr tablet, extended release   Take 1 tablet every day by oral route.    methadone (METHADOSE) 40 mg disintegrating tablet Take 100 mg by mouth once daily.    sofosbuvir-velpatasvir 400-100 mg Tab Take 1 tablet by mouth once daily.   Last reviewed on 3/28/2023  3:09 PM by Inga Darnell RN    Review of patient's allergies indicates:  No Known Allergies Last reviewed on  5/3/2023 11:46 AM by Estrellita Hernandez      Tasks added this encounter   No tasks added.   Tasks due within next 3 months   No tasks due.     Milla Gutierrez, PharmD  [facility]  [address] [place] [id number]  Phone: [phone]  Fax: [phone]

## 2023-05-18 ENCOUNTER — HOSPITAL ENCOUNTER (OUTPATIENT)
Facility: HOSPITAL | Age: 43
Discharge: HOME | End: 2023-05-18
Attending: INTERNAL MEDICINE | Admitting: INTERNAL MEDICINE
Payer: MEDICAID

## 2023-05-18 ENCOUNTER — ANESTHESIA EVENT (OUTPATIENT)
Dept: ENDOSCOPY | Facility: HOSPITAL | Age: 43
End: 2023-05-18
Payer: MEDICAID

## 2023-05-18 ENCOUNTER — ANESTHESIA (OUTPATIENT)
Dept: ENDOSCOPY | Facility: HOSPITAL | Age: 43
End: 2023-05-18
Payer: MEDICAID

## 2023-05-18 VITALS
HEIGHT: 68 IN | BODY MASS INDEX: 35.92 KG/M2 | RESPIRATION RATE: 18 BRPM | OXYGEN SATURATION: 98 % | TEMPERATURE: 99 F | WEIGHT: 237 LBS | SYSTOLIC BLOOD PRESSURE: 110 MMHG | HEART RATE: 68 BPM | DIASTOLIC BLOOD PRESSURE: 61 MMHG

## 2023-05-18 DIAGNOSIS — K80.50 CHOLEDOCHOLITHIASIS: Primary | ICD-10-CM

## 2023-05-18 DIAGNOSIS — R93.3 ABNORMAL FINDING ON GI TRACT IMAGING: ICD-10-CM

## 2023-05-18 DIAGNOSIS — K83.8 DILATED CBD, ACQUIRED: Primary | ICD-10-CM

## 2023-05-18 LAB
ALBUMIN SERPL BCP-MCNC: 3.5 G/DL (ref 3.5–5.2)
ALP SERPL-CCNC: 121 U/L (ref 55–135)
ALT SERPL W/O P-5'-P-CCNC: 41 U/L (ref 10–44)
ANION GAP SERPL CALC-SCNC: 10 MMOL/L (ref 8–16)
AST SERPL-CCNC: 51 U/L (ref 10–40)
BILIRUB SERPL-MCNC: 0.4 MG/DL (ref 0.1–1)
BUN SERPL-MCNC: 13 MG/DL (ref 6–20)
CALCIUM SERPL-MCNC: 9.1 MG/DL (ref 8.7–10.5)
CHLORIDE SERPL-SCNC: 103 MMOL/L (ref 95–110)
CO2 SERPL-SCNC: 24 MMOL/L (ref 23–29)
CREAT SERPL-MCNC: 0.7 MG/DL (ref 0.5–1.4)
EST. GFR  (NO RACE VARIABLE): >60 ML/MIN/1.73 M^2
GLUCOSE SERPL-MCNC: 100 MG/DL (ref 70–110)
POTASSIUM SERPL-SCNC: 4.4 MMOL/L (ref 3.5–5.1)
PROT SERPL-MCNC: 8.1 G/DL (ref 6–8.4)
SODIUM SERPL-SCNC: 137 MMOL/L (ref 136–145)

## 2023-05-18 PROCEDURE — 43259 PR ENDOSCOPIC ULTRASOUND EXAM: ICD-10-PCS | Mod: 51,,, | Performed by: INTERNAL MEDICINE

## 2023-05-18 PROCEDURE — 37000008 HC ANESTHESIA 1ST 15 MINUTES: Performed by: INTERNAL MEDICINE

## 2023-05-18 PROCEDURE — D9220A PRA ANESTHESIA: ICD-10-PCS | Mod: CRNA,,, | Performed by: NURSE ANESTHETIST, CERTIFIED REGISTERED

## 2023-05-18 PROCEDURE — 63600175 PHARM REV CODE 636 W HCPCS: Performed by: NURSE ANESTHETIST, CERTIFIED REGISTERED

## 2023-05-18 PROCEDURE — 25000003 PHARM REV CODE 250: Performed by: INTERNAL MEDICINE

## 2023-05-18 PROCEDURE — C1769 GUIDE WIRE: HCPCS | Performed by: INTERNAL MEDICINE

## 2023-05-18 PROCEDURE — 27202127 HC STENT INTRODUCER: Performed by: INTERNAL MEDICINE

## 2023-05-18 PROCEDURE — D9220A PRA ANESTHESIA: Mod: ANES,,, | Performed by: ANESTHESIOLOGY

## 2023-05-18 PROCEDURE — 25500020 PHARM REV CODE 255: Performed by: INTERNAL MEDICINE

## 2023-05-18 PROCEDURE — 43259 EGD US EXAM DUODENUM/JEJUNUM: CPT | Mod: 51,,, | Performed by: INTERNAL MEDICINE

## 2023-05-18 PROCEDURE — 43259 EGD US EXAM DUODENUM/JEJUNUM: CPT | Performed by: INTERNAL MEDICINE

## 2023-05-18 PROCEDURE — 43274 ERCP DUCT STENT PLACEMENT: CPT | Performed by: INTERNAL MEDICINE

## 2023-05-18 PROCEDURE — 74328 X-RAY BILE DUCT ENDOSCOPY: CPT | Mod: 26,,, | Performed by: INTERNAL MEDICINE

## 2023-05-18 PROCEDURE — 00732 ANES UPR GI NDSC PX ERCP: CPT | Performed by: INTERNAL MEDICINE

## 2023-05-18 PROCEDURE — D9220A PRA ANESTHESIA: ICD-10-PCS | Mod: ANES,,, | Performed by: ANESTHESIOLOGY

## 2023-05-18 PROCEDURE — 43264 ERCP REMOVE DUCT CALCULI: CPT | Performed by: INTERNAL MEDICINE

## 2023-05-18 PROCEDURE — 27201674 HC SPHINCTERTOME: Performed by: INTERNAL MEDICINE

## 2023-05-18 PROCEDURE — 27202125 HC BALLOON, EXTRACTION (ANY): Performed by: INTERNAL MEDICINE

## 2023-05-18 PROCEDURE — 43264 ERCP REMOVE DUCT CALCULI: CPT | Mod: ,,, | Performed by: INTERNAL MEDICINE

## 2023-05-18 PROCEDURE — 25000003 PHARM REV CODE 250: Performed by: NURSE ANESTHETIST, CERTIFIED REGISTERED

## 2023-05-18 PROCEDURE — 74328 PR  X-RAY FOR BILE DUCT ENDOSCOPY: ICD-10-PCS | Mod: 26,,, | Performed by: INTERNAL MEDICINE

## 2023-05-18 PROCEDURE — 74328 X-RAY BILE DUCT ENDOSCOPY: CPT | Mod: TC | Performed by: INTERNAL MEDICINE

## 2023-05-18 PROCEDURE — 43264 PR ERCP,W/REMOVAL STONE,BIL/PANCR DUCTS: ICD-10-PCS | Mod: ,,, | Performed by: INTERNAL MEDICINE

## 2023-05-18 PROCEDURE — C2617 STENT, NON-COR, TEM W/O DEL: HCPCS | Performed by: INTERNAL MEDICINE

## 2023-05-18 PROCEDURE — 80053 COMPREHEN METABOLIC PANEL: CPT | Performed by: INTERNAL MEDICINE

## 2023-05-18 PROCEDURE — 43274 PR ERCP W/STENT PLCMNT BILIARY/PANCREATIC DUCT: ICD-10-PCS | Mod: ,,, | Performed by: INTERNAL MEDICINE

## 2023-05-18 PROCEDURE — 37000009 HC ANESTHESIA EA ADD 15 MINS: Performed by: INTERNAL MEDICINE

## 2023-05-18 PROCEDURE — D9220A PRA ANESTHESIA: Mod: CRNA,,, | Performed by: NURSE ANESTHETIST, CERTIFIED REGISTERED

## 2023-05-18 PROCEDURE — 43274 ERCP DUCT STENT PLACEMENT: CPT | Mod: ,,, | Performed by: INTERNAL MEDICINE

## 2023-05-18 RX ORDER — DEXMEDETOMIDINE HYDROCHLORIDE 100 UG/ML
INJECTION, SOLUTION INTRAVENOUS
Status: DISCONTINUED | OUTPATIENT
Start: 2023-05-18 | End: 2023-05-18

## 2023-05-18 RX ORDER — PROPOFOL 10 MG/ML
VIAL (ML) INTRAVENOUS
Status: DISCONTINUED | OUTPATIENT
Start: 2023-05-18 | End: 2023-05-18

## 2023-05-18 RX ORDER — PHENYLEPHRINE HYDROCHLORIDE 10 MG/ML
INJECTION INTRAVENOUS
Status: DISCONTINUED | OUTPATIENT
Start: 2023-05-18 | End: 2023-05-18

## 2023-05-18 RX ORDER — SODIUM CHLORIDE 9 MG/ML
INJECTION, SOLUTION INTRAVENOUS CONTINUOUS
Status: DISCONTINUED | OUTPATIENT
Start: 2023-05-18 | End: 2023-05-18 | Stop reason: HOSPADM

## 2023-05-18 RX ORDER — FENTANYL CITRATE 50 UG/ML
INJECTION, SOLUTION INTRAMUSCULAR; INTRAVENOUS
Status: DISCONTINUED | OUTPATIENT
Start: 2023-05-18 | End: 2023-05-18

## 2023-05-18 RX ORDER — PROPOFOL 10 MG/ML
VIAL (ML) INTRAVENOUS CONTINUOUS PRN
Status: DISCONTINUED | OUTPATIENT
Start: 2023-05-18 | End: 2023-05-18

## 2023-05-18 RX ORDER — SODIUM CHLORIDE 0.9 % (FLUSH) 0.9 %
10 SYRINGE (ML) INJECTION
Status: DISCONTINUED | OUTPATIENT
Start: 2023-05-18 | End: 2023-05-18 | Stop reason: HOSPADM

## 2023-05-18 RX ORDER — CIPROFLOXACIN 500 MG/1
500 TABLET ORAL EVERY 12 HOURS
Qty: 10 TABLET | Refills: 0 | Status: SHIPPED | OUTPATIENT
Start: 2023-05-18 | End: 2023-05-23

## 2023-05-18 RX ORDER — INDOMETHACIN 50 MG/1
SUPPOSITORY RECTAL
Status: DISCONTINUED | OUTPATIENT
Start: 2023-05-18 | End: 2023-05-18 | Stop reason: HOSPADM

## 2023-05-18 RX ADMIN — Medication 30 MG: at 09:05

## 2023-05-18 RX ADMIN — FENTANYL CITRATE 100 MCG: 50 INJECTION, SOLUTION INTRAMUSCULAR; INTRAVENOUS at 08:05

## 2023-05-18 RX ADMIN — PHENYLEPHRINE HYDROCHLORIDE 100 MCG: 10 INJECTION INTRAVENOUS at 09:05

## 2023-05-18 RX ADMIN — BENZOCAINE 1 CAN: 200 SPRAY DENTAL; ORAL; PERIODONTAL at 08:05

## 2023-05-18 RX ADMIN — Medication 20 MG: at 08:05

## 2023-05-18 RX ADMIN — Medication 50 MG: at 08:05

## 2023-05-18 RX ADMIN — SODIUM CHLORIDE: 0.9 INJECTION, SOLUTION INTRAVENOUS at 08:05

## 2023-05-18 RX ADMIN — DEXMEDETOMIDINE HYDROCHLORIDE 8 MCG: 100 INJECTION, SOLUTION INTRAVENOUS at 08:05

## 2023-05-18 RX ADMIN — PHENYLEPHRINE HYDROCHLORIDE 100 MCG: 10 INJECTION INTRAVENOUS at 08:05

## 2023-05-18 RX ADMIN — PROPOFOL 75 MCG/KG/MIN: 10 INJECTION, EMULSION INTRAVENOUS at 08:05

## 2023-05-18 RX ADMIN — GLYCOPYRROLATE 0.2 MG: 0.2 INJECTION, SOLUTION INTRAMUSCULAR; INTRAVENOUS at 08:05

## 2023-05-18 NOTE — PROVATION PATIENT INSTRUCTIONS
Discharge Summary/Instructions after an Endoscopic Procedure  Patient Name: Paul Scott  Patient MRN: [id number]  Patient YOB: 1980  Thursday, May 18, 2023  Estrellita Hernandez MD  Dear patient,  As a result of recent federal legislation (The Federal Cures Act), you may   receive lab or pathology results from your procedure in your [facility]   account before your physician is able to contact you. Your physician or   their representative will relay the results to you with their   recommendations at their soonest availability.  Thank you,  RESTRICTIONS:  During your procedure today, you received medications for sedation.  These   medications may affect your judgment, balance and coordination.  Therefore,   for 24 hours, you have the following restrictions:   - DO NOT drive a car, operate machinery, make legal/financial decisions,   sign important papers or drink alcohol.    ACTIVITY:  Today: no heavy lifting, straining or running due to procedural   sedation/anesthesia.  The following day: return to full activity including work.  DIET:  Eat and drink normally unless instructed otherwise.     TREATMENT FOR COMMON SIDE EFFECTS:  - Mild abdominal pain, nausea, belching, bloating or excessive gas:  rest,   eat lightly and use a heating pad.  - Sore Throat: treat with throat lozenges and/or gargle with warm salt   water.  - Because air was used during the procedure, expelling large amounts of air   from your rectum or belching is normal.  - If a bowel prep was taken, you may not have a bowel movement for 1-3 days.    This is normal.  SYMPTOMS TO WATCH FOR AND REPORT TO YOUR PHYSICIAN:  1. Abdominal pain or bloating, other than gas cramps.  2. Chest pain.  3. Back pain.  4. Signs of infection such as: chills or fever occurring within 24 hours   after the procedure.  5. Rectal bleeding, which would show as bright red, maroon, or black stools.   (A tablespoon of blood from the rectum is not serious, especially if    hemorrhoids are present.)  6. Vomiting.  7. Weakness or dizziness.  GO DIRECTLY TO THE NEAREST EMERGENCY ROOM IF YOU HAVE ANY OF THE FOLLOWING:      Difficulty breathing              Chills and/or fever over 101 F   Persistent vomiting and/or vomiting blood   Severe abdominal pain   Severe chest pain   Black, tarry stools   Bleeding- more than one tablespoon   Any other symptom or condition that you feel may need urgent attention  Your doctor recommends these additional instructions:  If any biopsies were taken, your doctors clinic will contact you in 1 to 2   weeks with any results.  - Discharge patient to home (ambulatory).   - Perform an ERCP today.   - Refer to surgeon to discuss cholecystectomy.  - Return to referring physician.  For questions, problems or results please call your physician - Estrellita Hernandez MD at Work:  [phone].  [facility], EMERGENCY ROOM PHONE NUMBER: [phone]  IF A COMPLICATION OR EMERGENCY SITUATION ARISES AND YOU ARE UNABLE TO REACH   YOUR PHYSICIAN - GO DIRECTLY TO THE EMERGENCY ROOM.  Estrellita Hernandez MD  5/18/2023 9:47:25 AM  This report has been verified and signed electronically.  Dear patient,  As a result of recent federal legislation (The Federal Cures Act), you may   receive lab or pathology results from your procedure in your [facility]   account before your physician is able to contact you. Your physician or   their representative will relay the results to you with their   recommendations at their soonest availability.  Thank you,  PROVATION

## 2023-05-18 NOTE — PLAN OF CARE
Discharge instructions given and explained to patient and family with verbalization of understanding all instructions. Prescription given. Patients v/s stable, denies n/v and tolerating po, rates pain level tolerable, IV removed, and family at bedside for patient discharge home.

## 2023-05-18 NOTE — ANESTHESIA POSTPROCEDURE EVALUATION
Anesthesia Post Evaluation    Patient: Paul Scott JrNicole    Procedure(s) Performed: Procedure(s) (LRB):  ULTRASOUND, UPPER GI TRACT, ENDOSCOPIC (N/A)  ERCP (ENDOSCOPIC RETROGRADE CHOLANGIOPANCREATOGRAPHY) (N/A)  EGD (ESOPHAGOGASTRODUODENOSCOPY) (N/A)    Final Anesthesia Type: general      Patient location during evaluation: PACU  Patient participation: Yes- Able to Participate  Level of consciousness: awake and alert  Post-procedure vital signs: reviewed and stable  Pain management: adequate  Airway patency: patent    PONV status at discharge: No PONV  Anesthetic complications: no      Cardiovascular status: stable  Respiratory status: unassisted and spontaneous ventilation  Hydration status: euvolemic  Follow-up not needed.          Vitals Value Taken Time   /61 05/18/23 1047   Temp 37.1 °C (98.8 °F) 05/18/23 0950   Pulse 68 05/18/23 1100   Resp 18 05/18/23 1100   SpO2 97 % 05/18/23 1054   Vitals shown include unvalidated device data.      No case tracking events are documented in the log.      Pain/Jarrell Score: Jarrell Score: 10 (5/18/2023 10:45 AM)

## 2023-05-18 NOTE — PROVATION PATIENT INSTRUCTIONS
Discharge Summary/Instructions after an Endoscopic Procedure  Patient Name: Paul Scott  Patient MRN: [id number]  Patient YOB: 1980  Thursday, May 18, 2023  Estrellita Hernandez MD  Dear patient,  As a result of recent federal legislation (The Federal Cures Act), you may   receive lab or pathology results from your procedure in your [facility]   account before your physician is able to contact you. Your physician or   their representative will relay the results to you with their   recommendations at their soonest availability.  Thank you,  RESTRICTIONS:  During your procedure today, you received medications for sedation.  These   medications may affect your judgment, balance and coordination.  Therefore,   for 24 hours, you have the following restrictions:   - DO NOT drive a car, operate machinery, make legal/financial decisions,   sign important papers or drink alcohol.    ACTIVITY:  Today: no heavy lifting, straining or running due to procedural   sedation/anesthesia.  The following day: return to full activity including work.  DIET:  Eat and drink normally unless instructed otherwise.     TREATMENT FOR COMMON SIDE EFFECTS:  - Mild abdominal pain, nausea, belching, bloating or excessive gas:  rest,   eat lightly and use a heating pad.  - Sore Throat: treat with throat lozenges and/or gargle with warm salt   water.  - Because air was used during the procedure, expelling large amounts of air   from your rectum or belching is normal.  - If a bowel prep was taken, you may not have a bowel movement for 1-3 days.    This is normal.  SYMPTOMS TO WATCH FOR AND REPORT TO YOUR PHYSICIAN:  1. Abdominal pain or bloating, other than gas cramps.  2. Chest pain.  3. Back pain.  4. Signs of infection such as: chills or fever occurring within 24 hours   after the procedure.  5. Rectal bleeding, which would show as bright red, maroon, or black stools.   (A tablespoon of blood from the rectum is not serious, especially if    hemorrhoids are present.)  6. Vomiting.  7. Weakness or dizziness.  GO DIRECTLY TO THE NEAREST EMERGENCY ROOM IF YOU HAVE ANY OF THE FOLLOWING:      Difficulty breathing              Chills and/or fever over 101 F   Persistent vomiting and/or vomiting blood   Severe abdominal pain   Severe chest pain   Black, tarry stools   Bleeding- more than one tablespoon   Any other symptom or condition that you feel may need urgent attention  Your doctor recommends these additional instructions:  If any biopsies were taken, your doctors clinic will contact you in 1 to 2   weeks with any results.  - Discharge patient to home (ambulatory).   - Patient has a contact number available for emergencies.  The signs and   symptoms of potential delayed complications were discussed with the   patient.  Return to normal activities tomorrow.  Written discharge   instructions were provided to the patient.   - Resume previous diet.   - Cipro (ciprofloxacin) 500 mg PO BID for 5 days.   - Repeat ERCP in 8 weeks to remove stent.   - Return to referring physician.   - Refer to a surgeon at the next available appointment to discuss   cholecystectomy (preferably prior to when next ERCP is due for stent   removal).  For questions, problems or results please call your physician - Estrellita Hernandez MD at Work:  [phone].  [facility], EMERGENCY ROOM PHONE NUMBER: [phone]  IF A COMPLICATION OR EMERGENCY SITUATION ARISES AND YOU ARE UNABLE TO REACH   YOUR PHYSICIAN - GO DIRECTLY TO THE EMERGENCY ROOM.  Estrellita Hernandez MD  5/18/2023 10:01:58 AM  This report has been verified and signed electronically.  Dear patient,  As a result of recent federal legislation (The Federal Cures Act), you may   receive lab or pathology results from your procedure in your [facility]   account before your physician is able to contact you. Your physician or   their representative will relay the results to you with their   recommendations at their soonest  availability.  Thank you,  PROVATION

## 2023-05-18 NOTE — ANESTHESIA PREPROCEDURE EVALUATION
05/18/2023  Pual Scott Jr. is a 42 y.o., male.      Pre-op Assessment    I have reviewed the Patient Summary Reports.     I have reviewed the Nursing Notes. I have reviewed the NPO Status.   I have reviewed the Medications.     Review of Systems  Anesthesia Hx:  No problems with previous Anesthesia  History of prior surgery of interest to airway management or planning: Denies Family Hx of Anesthesia complications.   Denies Personal Hx of Anesthesia complications.   Cardiovascular:   Denies MI.  Denies CAD.    ECG has been reviewed.    Pulmonary:   Denies COPD. Asthma  Denies Sleep Apnea.    Renal/:  Renal/ Normal     Hepatic/GI:   Hepatitis, C    Musculoskeletal:  Musculoskeletal Normal    Neurological:  Neurology Normal Denies Seizures.    Endocrine:   Denies Diabetes.        Physical Exam  General: Well nourished, Cooperative, Alert and Oriented    Airway:  Mouth Opening: Normal  TM Distance: Normal  Tongue: Normal  Neck ROM: Normal ROM    Dental:  Periodontal disease    Chest/Lungs:  Clear to auscultation, Normal Respiratory Rate    Heart:  Rate: Normal  Rhythm: Regular Rhythm  Sounds: Normal    Abdomen:  Normal        Anesthesia Plan  Type of Anesthesia, risks & benefits discussed:    Anesthesia Type: Gen Natural Airway, Gen ETT, MAC  Intra-op Monitoring Plan: Standard ASA Monitors  Post Op Pain Control Plan: multimodal analgesia and IV/PO Opioids PRN  Induction:  IV  Airway Plan: Direct, Post-Induction  Informed Consent: Informed consent signed with the Patient and all parties understand the risks and agree with anesthesia plan.  All questions answered.   ASA Score: 3  Day of Surgery Review of History & Physical: H&P Update referred to the surgeon/provider.    Ready For Surgery From Anesthesia Perspective.     .

## 2023-05-18 NOTE — H&P
Short Stay Endoscopy History and Physical    PCP - Blanco Hernandez MD PhD  Referring Physician - Estrellita Hernandez MD  [address] [place] [address]    Procedure - EGD, EUS +/- ERCP  ASA - per anesthesia  Mallampati - per anesthesia  History of Anesthesia problems - per anesthesia  Family history Anesthesia problems -  per anesthesia   Plan of anesthesia - per anesthesia    HPI:  This is a 42 y.o. male here for evaluation of: EGD, EUS +/- ERCP pending EUS findings; chronic liver disease with HCV on treatment; r/o varices; eval with EUS for CBD and PD dilation (noted patient had US revealing gallstones in GB) with no biliary symptoms; possible ERCP if EUS demonstrates obstructive biliary process such as CBD stone/panc/ampullary mass/or stricture.    Patient does endorse abdo bloating, distension and weight gain.    No jaundice.    Reflux - no  Dysphagia - no  Abdominal pain - no  Diarrhea - no    ROS:  Constitutional: No fevers, chills, No weight loss  CV: No chest pain  Pulm: No cough, No shortness of breath  Ophtho: No vision changes  GI: see HPI  Derm: No rash    Medical History:  has a past medical history of Asthma, Cirrhosis (1/31/2023), Elevated liver function tests (1/31/2023), and Hepatitis C antibody test positive (1/31/2023).    Surgical History:  has a past surgical history that includes Endoscopic ultrasound of upper gastrointestinal tract (N/A, 5/3/2023) and ERCP (N/A, 5/3/2023).    Family History: family history is not on file..    Social History:  reports that he has been smoking cigarettes. He has been smoking an average of 1 pack per day. He has never used smokeless tobacco. He reports that he does not currently use alcohol. He reports that he does not currently use drugs.    Review of patient's allergies indicates:  No Known Allergies    Medications:   Medications Prior to Admission   Medication Sig Dispense Refill Last Dose    methadone (METHADOSE) 40 mg disintegrating tablet Take 100  mg by mouth once daily.   5/17/2023    sofosbuvir-velpatasvir 400-100 mg Tab Take 1 tablet by mouth once daily. 28 tablet 2 5/18/2023    buPROPion (WELLBUTRIN XL) 150 MG TB24 tablet bupropion HCl  mg 24 hr tablet, extended release   Take 1 tablet every day by oral route.   More than a month       Physical Exam:    Vital Signs:   Vitals:    05/18/23 0816   BP: 128/77   Pulse:    Resp:    Temp:        General Appearance: Well appearing in no acute distress    Labs:  Lab Results   Component Value Date    WBC 8.68 05/03/2023    HGB 14.1 05/03/2023    HCT 41.6 05/03/2023     05/03/2023     (H) 01/31/2023     (H) 01/31/2023     (L) 01/31/2023    K 4.4 01/31/2023     01/31/2023    CREATININE 0.8 01/31/2023    BUN 12 01/31/2023    CO2 24 01/31/2023    INR 1.0 05/03/2023       I have explained the risks and benefits of this endoscopic procedure to the patient including but not limited to bleeding, inflammation, infection, perforation, pancreatitis, missing a lesion and death.      Estrellita Hernandez MD

## 2023-05-18 NOTE — TRANSFER OF CARE
"Anesthesia Transfer of Care Note    Patient: Paul Scott Jr.    Procedure(s) Performed: Procedure(s) (LRB):  ULTRASOUND, UPPER GI TRACT, ENDOSCOPIC (N/A)  ERCP (ENDOSCOPIC RETROGRADE CHOLANGIOPANCREATOGRAPHY) (N/A)  EGD (ESOPHAGOGASTRODUODENOSCOPY) (N/A)    Patient location: [facility]    Anesthesia Type: general    Transport from OR: Transported from OR on 2-3 L/min O2 by NC with adequate spontaneous ventilation    Post pain: adequate analgesia    Post assessment: no apparent anesthetic complications and tolerated procedure well    Post vital signs: stable    Level of consciousness: awake and alert    Nausea/Vomiting: no nausea/vomiting    Complications: none    Transfer of care protocol was followed      Last vitals:   Visit Vitals  /60   Pulse 60   Temp 37.1 °C (98.8 °F) (Temporal)   Resp 18   Ht 5' 8" (1.727 m)   Wt 107.5 kg (236 lb 15.9 oz)   SpO2 96%   BMI 36.03 kg/m²     "

## 2023-05-21 ENCOUNTER — TELEPHONE (OUTPATIENT)
Dept: ENDOSCOPY | Facility: HOSPITAL | Age: 43
End: 2023-05-21

## 2023-05-21 DIAGNOSIS — K83.1 BILIARY STRICTURE: Primary | ICD-10-CM

## 2023-05-29 ENCOUNTER — TELEPHONE (OUTPATIENT)
Dept: HEPATOLOGY | Facility: CLINIC | Age: 43
End: 2023-05-29
Payer: MEDICAID

## 2023-05-29 NOTE — TELEPHONE ENCOUNTER
----- Message from Clau Pena RN sent at 5/29/2023 10:36 AM CDT -----  Patient taking Epclusa.  He did not have wk 6 labs drawn on 5/4 which included HBV DNA.  Multiple attempts made to coordinate.  Just FYI

## 2023-06-08 ENCOUNTER — TELEPHONE (OUTPATIENT)
Dept: HEPATOLOGY | Facility: CLINIC | Age: 43
End: 2023-06-08
Payer: MEDICAID

## 2023-06-08 DIAGNOSIS — B18.2 CHRONIC HEPATITIS C WITHOUT HEPATIC COMA: Primary | ICD-10-CM

## 2023-06-08 NOTE — TELEPHONE ENCOUNTER
----- Message from Jennifer B. Scheuermann, PA sent at 6/8/2023  2:28 PM CDT -----  No problem  Can you add HBV DNA to SVR labs?  thanks  ----- Message -----  From: Clau Pena RN  Sent: 6/8/2023  12:55 PM CDT  To: Jennifer B. Scheuermann, PA    Patient ordered to have wk 6 labs done on 5/4/23.  He finally completed draw on 6/2/23.  Hep B dna quant cancelled.  Unsure why.

## 2023-06-09 NOTE — TELEPHONE ENCOUNTER
Pt began 12 weeks of Epclusa on 3/24/23  Anticipated treatment end date: 6/15/23  Carrie 1b  Treatment naive  F4    6/2/23  HCV LLOQ  HBV not done - lab error  LFT improving    Pls tell pt  Labs look good  Liver numbers have significantly improved  HCV is almost negative  Should finish HCV treatment soon    Next labs to see if HCV is cured: LFT, HCV RNA - SVR12 - 9/15/23

## 2023-07-11 ENCOUNTER — TELEPHONE (OUTPATIENT)
Dept: ENDOSCOPY | Facility: HOSPITAL | Age: 43
End: 2023-07-11
Payer: MEDICAID

## 2023-07-17 ENCOUNTER — TELEPHONE (OUTPATIENT)
Dept: ENDOSCOPY | Facility: HOSPITAL | Age: 43
End: 2023-07-17
Payer: MEDICAID

## 2023-07-18 ENCOUNTER — OFFICE VISIT (OUTPATIENT)
Dept: SURGERY | Facility: CLINIC | Age: 43
End: 2023-07-18
Payer: MEDICAID

## 2023-07-18 VITALS
BODY MASS INDEX: 35.42 KG/M2 | DIASTOLIC BLOOD PRESSURE: 77 MMHG | HEART RATE: 74 BPM | SYSTOLIC BLOOD PRESSURE: 124 MMHG | HEIGHT: 68 IN | WEIGHT: 233.69 LBS

## 2023-07-18 DIAGNOSIS — K80.50 CHOLEDOCHOLITHIASIS: Primary | ICD-10-CM

## 2023-07-18 DIAGNOSIS — K80.70 CHOLELITHIASIS WITH CHOLEDOCHOLITHIASIS: ICD-10-CM

## 2023-07-18 PROCEDURE — 3008F BODY MASS INDEX DOCD: CPT | Mod: CPTII,,, | Performed by: SURGERY

## 2023-07-18 PROCEDURE — 99204 OFFICE O/P NEW MOD 45 MIN: CPT | Mod: S$PBB,,, | Performed by: SURGERY

## 2023-07-18 PROCEDURE — 3074F SYST BP LT 130 MM HG: CPT | Mod: CPTII,,, | Performed by: SURGERY

## 2023-07-18 PROCEDURE — 1160F RVW MEDS BY RX/DR IN RCRD: CPT | Mod: CPTII,,, | Performed by: SURGERY

## 2023-07-18 PROCEDURE — 3078F PR MOST RECENT DIASTOLIC BLOOD PRESSURE < 80 MM HG: ICD-10-PCS | Mod: CPTII,,, | Performed by: SURGERY

## 2023-07-18 PROCEDURE — 1159F MED LIST DOCD IN RCRD: CPT | Mod: CPTII,,, | Performed by: SURGERY

## 2023-07-18 PROCEDURE — 99999 PR PBB SHADOW E&M-EST. PATIENT-LVL IV: CPT | Mod: PBBFAC,,, | Performed by: SURGERY

## 2023-07-18 PROCEDURE — 99204 PR OFFICE/OUTPT VISIT, NEW, LEVL IV, 45-59 MIN: ICD-10-PCS | Mod: S$PBB,,, | Performed by: SURGERY

## 2023-07-18 PROCEDURE — 99214 OFFICE O/P EST MOD 30 MIN: CPT | Mod: PBBFAC,PN | Performed by: SURGERY

## 2023-07-18 PROCEDURE — 3074F PR MOST RECENT SYSTOLIC BLOOD PRESSURE < 130 MM HG: ICD-10-PCS | Mod: CPTII,,, | Performed by: SURGERY

## 2023-07-18 PROCEDURE — 1160F PR REVIEW ALL MEDS BY PRESCRIBER/CLIN PHARMACIST DOCUMENTED: ICD-10-PCS | Mod: CPTII,,, | Performed by: SURGERY

## 2023-07-18 PROCEDURE — 1159F PR MEDICATION LIST DOCUMENTED IN MEDICAL RECORD: ICD-10-PCS | Mod: CPTII,,, | Performed by: SURGERY

## 2023-07-18 PROCEDURE — 3008F PR BODY MASS INDEX (BMI) DOCUMENTED: ICD-10-PCS | Mod: CPTII,,, | Performed by: SURGERY

## 2023-07-18 PROCEDURE — 3078F DIAST BP <80 MM HG: CPT | Mod: CPTII,,, | Performed by: SURGERY

## 2023-07-18 PROCEDURE — 99999 PR PBB SHADOW E&M-EST. PATIENT-LVL IV: ICD-10-PCS | Mod: PBBFAC,,, | Performed by: SURGERY

## 2023-07-18 RX ORDER — CEFAZOLIN SODIUM 2 G/50ML
2 SOLUTION INTRAVENOUS
Status: CANCELLED | OUTPATIENT
Start: 2023-07-18

## 2023-07-18 RX ORDER — ENOXAPARIN SODIUM 100 MG/ML
40 INJECTION SUBCUTANEOUS
Status: CANCELLED | OUTPATIENT
Start: 2023-08-03

## 2023-07-20 ENCOUNTER — TELEPHONE (OUTPATIENT)
Dept: ENDOSCOPY | Facility: HOSPITAL | Age: 43
End: 2023-07-20
Payer: MEDICAID
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
## 2023-07-24 ENCOUNTER — TELEPHONE (OUTPATIENT)
Dept: ENDOSCOPY | Facility: HOSPITAL | Age: 43
End: 2023-07-24
Payer: MEDICAID

## 2023-07-25 ENCOUNTER — TELEPHONE (OUTPATIENT)
Dept: ENDOSCOPY | Facility: HOSPITAL | Age: 43
End: 2023-07-25
Payer: MEDICAID

## 2023-07-28 ENCOUNTER — TELEPHONE (OUTPATIENT)
Dept: ENDOSCOPY | Facility: HOSPITAL | Age: 43
End: 2023-07-28
Payer: MEDICAID

## 2023-07-28 ENCOUNTER — PATIENT MESSAGE (OUTPATIENT)
Dept: ENDOSCOPY | Facility: HOSPITAL | Age: 43
End: 2023-07-28
Payer: MEDICAID

## 2023-07-28 DIAGNOSIS — K74.60 HEPATIC CIRRHOSIS, UNSPECIFIED HEPATIC CIRRHOSIS TYPE, UNSPECIFIED WHETHER ASCITES PRESENT: Primary | ICD-10-CM

## 2023-07-28 NOTE — TELEPHONE ENCOUNTER
Telephoned pt to schedule ERCP.  Spoke with pt's daughter, Destiny, and procedure scheduled for 8/22/23 at 2:45pm with CBC/PT/INR at 1:30pm for h/o cirrhosis.  Reviewed history and medications.  Instructed on procedure and preparation.  Destiny verbalized understanding.  Instructions sent via patient portal.  Instructed her on how to locate prep instructions within the portal.  She verbalized understanding.

## 2023-07-28 NOTE — TELEPHONE ENCOUNTER
Telephoned pt to schedule ERCP.  Spoke with pt's daughter, Destiny, and procedure scheduled for 8/16/23 at 10:15am with 8:45am arrival time for CBC/PT/INR as pt has cirrhosis.  Reviewed history and medications.  Instructed on procedure and preparation.  Destiny verbalized understanding.  Instructions sent via patient portal.  Instructed her on how to locate prep instructions within the portal.  She verbalized understanding.

## 2023-08-21 ENCOUNTER — OFFICE VISIT (OUTPATIENT)
Dept: SURGERY | Facility: CLINIC | Age: 43
End: 2023-08-21
Payer: MEDICAID

## 2023-08-21 VITALS — WEIGHT: 232 LBS | HEIGHT: 68 IN | BODY MASS INDEX: 35.16 KG/M2

## 2023-08-21 DIAGNOSIS — Z98.890 POST-OPERATIVE STATE: Primary | ICD-10-CM

## 2023-08-21 PROCEDURE — 99024 POSTOP FOLLOW-UP VISIT: CPT | Mod: ,,, | Performed by: SURGERY

## 2023-08-21 PROCEDURE — 3008F PR BODY MASS INDEX (BMI) DOCUMENTED: ICD-10-PCS | Mod: CPTII,,, | Performed by: SURGERY

## 2023-08-21 PROCEDURE — 99999 PR PBB SHADOW E&M-EST. PATIENT-LVL II: CPT | Mod: PBBFAC,,, | Performed by: SURGERY

## 2023-08-21 PROCEDURE — 99212 OFFICE O/P EST SF 10 MIN: CPT | Mod: PBBFAC,PN | Performed by: SURGERY

## 2023-08-21 PROCEDURE — 99024 PR POST-OP FOLLOW-UP VISIT: ICD-10-PCS | Mod: ,,, | Performed by: SURGERY

## 2023-08-21 PROCEDURE — 1160F PR REVIEW ALL MEDS BY PRESCRIBER/CLIN PHARMACIST DOCUMENTED: ICD-10-PCS | Mod: CPTII,,, | Performed by: SURGERY

## 2023-08-21 PROCEDURE — 1159F PR MEDICATION LIST DOCUMENTED IN MEDICAL RECORD: ICD-10-PCS | Mod: CPTII,,, | Performed by: SURGERY

## 2023-08-21 PROCEDURE — 1160F RVW MEDS BY RX/DR IN RCRD: CPT | Mod: CPTII,,, | Performed by: SURGERY

## 2023-08-21 PROCEDURE — 3008F BODY MASS INDEX DOCD: CPT | Mod: CPTII,,, | Performed by: SURGERY

## 2023-08-21 PROCEDURE — 1159F MED LIST DOCD IN RCRD: CPT | Mod: CPTII,,, | Performed by: SURGERY

## 2023-08-21 PROCEDURE — 99999 PR PBB SHADOW E&M-EST. PATIENT-LVL II: ICD-10-PCS | Mod: PBBFAC,,, | Performed by: SURGERY

## 2023-08-22 ENCOUNTER — TELEPHONE (OUTPATIENT)
Dept: ENDOSCOPY | Facility: HOSPITAL | Age: 43
End: 2023-08-22
Payer: MEDICAID

## 2023-08-22 ENCOUNTER — PATIENT MESSAGE (OUTPATIENT)
Dept: ENDOSCOPY | Facility: HOSPITAL | Age: 43
End: 2023-08-22
Payer: MEDICAID

## 2023-08-22 ENCOUNTER — LAB VISIT (OUTPATIENT)
Dept: LAB | Facility: HOSPITAL | Age: 43
End: 2023-08-22
Attending: INTERNAL MEDICINE
Payer: MEDICAID

## 2023-08-22 DIAGNOSIS — K74.60 HEPATIC CIRRHOSIS, UNSPECIFIED HEPATIC CIRRHOSIS TYPE, UNSPECIFIED WHETHER ASCITES PRESENT: ICD-10-CM

## 2023-08-22 DIAGNOSIS — K80.70 CALCULUS OF GALLBLADDER AND BILE DUCT WITHOUT CHOLECYSTITIS OR OBSTRUCTION: Primary | ICD-10-CM

## 2023-08-22 LAB
ANISOCYTOSIS BLD QL SMEAR: SLIGHT
BASOPHILS # BLD AUTO: 0.06 K/UL (ref 0–0.2)
BASOPHILS NFR BLD: 0.6 % (ref 0–1.9)
DIFFERENTIAL METHOD: ABNORMAL
EOSINOPHIL # BLD AUTO: 0.2 K/UL (ref 0–0.5)
EOSINOPHIL NFR BLD: 2.4 % (ref 0–8)
ERYTHROCYTE [DISTWIDTH] IN BLOOD BY AUTOMATED COUNT: 13.1 % (ref 11.5–14.5)
HCT VFR BLD AUTO: 48.8 % (ref 40–54)
HGB BLD-MCNC: 16.1 G/DL (ref 14–18)
HYPOCHROMIA BLD QL SMEAR: ABNORMAL
IMM GRANULOCYTES # BLD AUTO: 0.03 K/UL (ref 0–0.04)
IMM GRANULOCYTES NFR BLD AUTO: 0.3 % (ref 0–0.5)
INR PPP: 1.1 (ref 0.8–1.2)
LYMPHOCYTES # BLD AUTO: 3.2 K/UL (ref 1–4.8)
LYMPHOCYTES NFR BLD: 33.8 % (ref 18–48)
MCH RBC QN AUTO: 30.5 PG (ref 27–31)
MCHC RBC AUTO-ENTMCNC: 33 G/DL (ref 32–36)
MCV RBC AUTO: 92 FL (ref 82–98)
MONOCYTES # BLD AUTO: 0.3 K/UL (ref 0.3–1)
MONOCYTES NFR BLD: 3.6 % (ref 4–15)
NEUTROPHILS # BLD AUTO: 5.6 K/UL (ref 1.8–7.7)
NEUTROPHILS NFR BLD: 59.3 % (ref 38–73)
NRBC BLD-RTO: 0 /100 WBC
OVALOCYTES BLD QL SMEAR: ABNORMAL
PLATELET # BLD AUTO: 168 K/UL (ref 150–450)
PMV BLD AUTO: ABNORMAL FL (ref 9.2–12.9)
POIKILOCYTOSIS BLD QL SMEAR: SLIGHT
POLYCHROMASIA BLD QL SMEAR: ABNORMAL
PROTHROMBIN TIME: 11.6 SEC (ref 9–12.5)
RBC # BLD AUTO: 5.28 M/UL (ref 4.6–6.2)
SPHEROCYTES BLD QL SMEAR: ABNORMAL
WBC # BLD AUTO: 9.39 K/UL (ref 3.9–12.7)

## 2023-08-22 PROCEDURE — 85025 COMPLETE CBC W/AUTO DIFF WBC: CPT | Performed by: INTERNAL MEDICINE

## 2023-08-22 PROCEDURE — 36415 COLL VENOUS BLD VENIPUNCTURE: CPT | Performed by: INTERNAL MEDICINE

## 2023-08-22 PROCEDURE — 85610 PROTHROMBIN TIME: CPT | Performed by: INTERNAL MEDICINE

## 2023-08-22 NOTE — TELEPHONE ENCOUNTER
Received message pt's ERCP scheduled for today needs to be rescheduled.  Pt came in and had cirrhosis labs drawn, however he was not feeling well and needed to leave.  Spoke with pt and he states he received pain medication after his gall bladder surgery and was subsequently kicked out of the methadone program he was in.  He states he is day 4 cold turkey and is clammy and anxious and feels ill.  Procedure rescheduled for 9/21/23 at 2:00pm with Dr. Montero at [facility].  Updated instructios via portal.  Explained importance of pt keeping this appointment to have his stent removed.  Pt expressed understanding.

## 2023-08-27 NOTE — PROGRESS NOTES
"Paul Scott Jr. is a 43 y.o. male patient.   2 weeks s/p lap michael  Doing well  Vincent diet  No nausea or vomiting  Normal bm  Incisions healing well  No diagnosis found.  Past Medical History:   Diagnosis Date    Asthma     Cirrhosis 1/31/2023    Elevated liver function tests 1/31/2023    Hepatitis C antibody test positive 1/31/2023     No past surgical history pertinent negatives on file.  Scheduled Meds:  Continuous Infusions:  PRN Meds:    Review of patient's allergies indicates:  No Known Allergies  There are no hospital problems to display for this patient.    Height 5' 8" (1.727 m), weight 105.2 kg (232 lb).    Subjective:   Diet: Adequate intake.  Patient reports no nausea.    Activity level: Returning to normal.    Pain control: Well controlled.    Objective:  Vital signs (most recent): Height 5' 8" (1.727 m), weight 105.2 kg (232 lb).  General appearance: Comfortable.    Lungs:  Normal effort.    Heart: Normal rate.    Abdomen: Abdomen is soft.    Bowel sounds:  Bowel sounds are normal.    Tenderness: There is no abdominal tenderness tenderness.    Wound:  Clean.    Extremities: There is normal range of motion.    Neurological: The patient is alert.    Assessment & Plan  2 weeks s/p lap michael  Rtc prn  Sha Garrison MD  8/27/2023    "

## 2023-09-21 ENCOUNTER — HOSPITAL ENCOUNTER (OUTPATIENT)
Facility: HOSPITAL | Age: 43
Discharge: HOME | End: 2023-09-21
Attending: INTERNAL MEDICINE | Admitting: INTERNAL MEDICINE
Payer: MEDICAID

## 2023-09-21 ENCOUNTER — ANESTHESIA (OUTPATIENT)
Dept: ENDOSCOPY | Facility: HOSPITAL | Age: 43
End: 2023-09-21
Payer: MEDICAID

## 2023-09-21 ENCOUNTER — ANESTHESIA EVENT (OUTPATIENT)
Dept: ENDOSCOPY | Facility: HOSPITAL | Age: 43
End: 2023-09-21
Payer: MEDICAID

## 2023-09-21 VITALS
DIASTOLIC BLOOD PRESSURE: 62 MMHG | TEMPERATURE: 98 F | HEIGHT: 68 IN | RESPIRATION RATE: 20 BRPM | WEIGHT: 232 LBS | SYSTOLIC BLOOD PRESSURE: 122 MMHG | HEART RATE: 74 BPM | BODY MASS INDEX: 35.16 KG/M2 | OXYGEN SATURATION: 97 %

## 2023-09-21 DIAGNOSIS — K80.50 CHOLEDOCHOLITHIASIS: ICD-10-CM

## 2023-09-21 DIAGNOSIS — R79.89 ELEVATED LIVER FUNCTION TESTS: Primary | ICD-10-CM

## 2023-09-21 PROCEDURE — D9220A PRA ANESTHESIA: ICD-10-PCS | Mod: ANES,,, | Performed by: ANESTHESIOLOGY

## 2023-09-21 PROCEDURE — D9220A PRA ANESTHESIA: Mod: ANES,,, | Performed by: ANESTHESIOLOGY

## 2023-09-21 PROCEDURE — 74328 X-RAY BILE DUCT ENDOSCOPY: CPT | Mod: 26,,, | Performed by: INTERNAL MEDICINE

## 2023-09-21 PROCEDURE — 27202125 HC BALLOON, EXTRACTION (ANY): Performed by: INTERNAL MEDICINE

## 2023-09-21 PROCEDURE — 37000009 HC ANESTHESIA EA ADD 15 MINS: Performed by: INTERNAL MEDICINE

## 2023-09-21 PROCEDURE — 43275 ERCP REMOVE FORGN BODY DUCT: CPT | Performed by: INTERNAL MEDICINE

## 2023-09-21 PROCEDURE — 43275 ERCP REMOVE FORGN BODY DUCT: CPT | Mod: ,,, | Performed by: INTERNAL MEDICINE

## 2023-09-21 PROCEDURE — 25000242 PHARM REV CODE 250 ALT 637 W/ HCPCS: Performed by: NURSE ANESTHETIST, CERTIFIED REGISTERED

## 2023-09-21 PROCEDURE — 63600175 PHARM REV CODE 636 W HCPCS: Performed by: NURSE ANESTHETIST, CERTIFIED REGISTERED

## 2023-09-21 PROCEDURE — 37000008 HC ANESTHESIA 1ST 15 MINUTES: Performed by: INTERNAL MEDICINE

## 2023-09-21 PROCEDURE — D9220A PRA ANESTHESIA: Mod: CRNA,,, | Performed by: NURSE ANESTHETIST, CERTIFIED REGISTERED

## 2023-09-21 PROCEDURE — 27201089 HC SNARE, DISP (ANY): Performed by: INTERNAL MEDICINE

## 2023-09-21 PROCEDURE — 74328 PR  X-RAY FOR BILE DUCT ENDOSCOPY: ICD-10-PCS | Mod: 26,,, | Performed by: INTERNAL MEDICINE

## 2023-09-21 PROCEDURE — C1769 GUIDE WIRE: HCPCS | Performed by: INTERNAL MEDICINE

## 2023-09-21 PROCEDURE — D9220A PRA ANESTHESIA: ICD-10-PCS | Mod: CRNA,,, | Performed by: NURSE ANESTHETIST, CERTIFIED REGISTERED

## 2023-09-21 PROCEDURE — 43275 PR ERCP W/REMOVAL FOREIGN BODY/STENT FROM BILIARY/PANCREATIC DUCT: ICD-10-PCS | Mod: ,,, | Performed by: INTERNAL MEDICINE

## 2023-09-21 PROCEDURE — 74328 X-RAY BILE DUCT ENDOSCOPY: CPT | Mod: TC | Performed by: INTERNAL MEDICINE

## 2023-09-21 PROCEDURE — 25000003 PHARM REV CODE 250: Performed by: NURSE ANESTHETIST, CERTIFIED REGISTERED

## 2023-09-21 PROCEDURE — 25500020 PHARM REV CODE 255: Performed by: INTERNAL MEDICINE

## 2023-09-21 RX ORDER — SODIUM CHLORIDE 0.9 % (FLUSH) 0.9 %
3 SYRINGE (ML) INJECTION
Status: CANCELLED | OUTPATIENT
Start: 2023-09-21

## 2023-09-21 RX ORDER — SODIUM CHLORIDE 0.9 % (FLUSH) 0.9 %
10 SYRINGE (ML) INJECTION
Status: DISCONTINUED | OUTPATIENT
Start: 2023-09-21 | End: 2023-09-21 | Stop reason: HOSPADM

## 2023-09-21 RX ORDER — PROPOFOL 10 MG/ML
VIAL (ML) INTRAVENOUS
Status: DISCONTINUED | OUTPATIENT
Start: 2023-09-21 | End: 2023-09-21

## 2023-09-21 RX ORDER — CIPROFLOXACIN 2 MG/ML
INJECTION, SOLUTION INTRAVENOUS
Status: DISCONTINUED | OUTPATIENT
Start: 2023-09-21 | End: 2023-09-21

## 2023-09-21 RX ORDER — MIDAZOLAM HYDROCHLORIDE 1 MG/ML
INJECTION, SOLUTION INTRAMUSCULAR; INTRAVENOUS
Status: DISCONTINUED | OUTPATIENT
Start: 2023-09-21 | End: 2023-09-21

## 2023-09-21 RX ORDER — ALBUTEROL SULFATE 90 UG/1
AEROSOL, METERED RESPIRATORY (INHALATION)
Status: DISCONTINUED | OUTPATIENT
Start: 2023-09-21 | End: 2023-09-21

## 2023-09-21 RX ORDER — KETAMINE HCL IN 0.9 % NACL 50 MG/5 ML
SYRINGE (ML) INTRAVENOUS
Status: DISCONTINUED | OUTPATIENT
Start: 2023-09-21 | End: 2023-09-21

## 2023-09-21 RX ORDER — FENTANYL CITRATE 50 UG/ML
INJECTION, SOLUTION INTRAMUSCULAR; INTRAVENOUS
Status: DISCONTINUED | OUTPATIENT
Start: 2023-09-21 | End: 2023-09-21

## 2023-09-21 RX ORDER — LIDOCAINE HYDROCHLORIDE 20 MG/ML
INJECTION INTRAVENOUS
Status: DISCONTINUED | OUTPATIENT
Start: 2023-09-21 | End: 2023-09-21

## 2023-09-21 RX ORDER — SODIUM CHLORIDE 9 MG/ML
INJECTION, SOLUTION INTRAVENOUS CONTINUOUS
Status: DISCONTINUED | OUTPATIENT
Start: 2023-09-21 | End: 2023-09-21 | Stop reason: HOSPADM

## 2023-09-21 RX ORDER — CIPROFLOXACIN 500 MG/1
500 TABLET ORAL EVERY 12 HOURS
Qty: 10 TABLET | Refills: 0 | Status: SHIPPED | OUTPATIENT
Start: 2023-09-21 | End: 2023-09-26

## 2023-09-21 RX ADMIN — Medication 10 MG: at 02:09

## 2023-09-21 RX ADMIN — CIPROFLOXACIN 400 MG: 2 INJECTION, SOLUTION INTRAVENOUS at 03:09

## 2023-09-21 RX ADMIN — MIDAZOLAM HYDROCHLORIDE 1 MG: 1 INJECTION, SOLUTION INTRAMUSCULAR; INTRAVENOUS at 02:09

## 2023-09-21 RX ADMIN — PROPOFOL 30 MG: 10 INJECTION, EMULSION INTRAVENOUS at 02:09

## 2023-09-21 RX ADMIN — GLYCOPYRROLATE 0.1 MG: 0.2 INJECTION, SOLUTION INTRAMUSCULAR; INTRAVENOUS at 02:09

## 2023-09-21 RX ADMIN — FENTANYL CITRATE 50 MCG: 50 INJECTION, SOLUTION INTRAMUSCULAR; INTRAVENOUS at 02:09

## 2023-09-21 RX ADMIN — SODIUM CHLORIDE: 0.9 INJECTION, SOLUTION INTRAVENOUS at 02:09

## 2023-09-21 RX ADMIN — LIDOCAINE HYDROCHLORIDE 100 MG: 20 INJECTION INTRAVENOUS at 02:09

## 2023-09-21 RX ADMIN — ALBUTEROL SULFATE 3 PUFF: 108 AEROSOL, METERED RESPIRATORY (INHALATION) at 02:09

## 2023-09-21 NOTE — TRANSFER OF CARE
"Anesthesia Transfer of Care Note    Patient: Paul Scott Jr.    Procedure(s) Performed: Procedure(s) (LRB):  ERCP (ENDOSCOPIC RETROGRADE CHOLANGIOPANCREATOGRAPHY) (N/A)    Patient location: PACU    Anesthesia Type: general    Transport from OR: Transported from OR on room air with adequate spontaneous ventilation    Post pain: adequate analgesia    Post assessment: no apparent anesthetic complications and tolerated procedure well    Post vital signs: stable    Level of consciousness: awake    Nausea/Vomiting: no nausea/vomiting    Complications: none    Transfer of care protocol was followed      Last vitals:   Visit Vitals  BP (!) 140/71   Pulse 87   Temp 36.7 °C (98.1 °F)   Resp 17   Ht 5' 8" (1.727 m)   Wt 105.2 kg (232 lb)   SpO2 96%   BMI 35.28 kg/m²     "
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
## 2023-09-21 NOTE — PROVATION PATIENT INSTRUCTIONS
Discharge Summary/Instructions after an Endoscopic Procedure  Patient Name: Paul Scott  Patient MRN: [id number]  Patient YOB: 1980 Thursday, September 21, 2023  Brenden Montero MD  Dear patient,  As a result of recent federal legislation (The Federal Cures Act), you may   receive lab or pathology results from your procedure in your [facility]   account before your physician is able to contact you. Your physician or   their representative will relay the results to you with their   recommendations at their soonest availability.  Thank you,  RESTRICTIONS:  During your procedure today, you received medications for sedation.  These   medications may affect your judgment, balance and coordination.  Therefore,   for 24 hours, you have the following restrictions:   - DO NOT drive a car, operate machinery, make legal/financial decisions,   sign important papers or drink alcohol.    ACTIVITY:  Today: no heavy lifting, straining or running due to procedural   sedation/anesthesia.  The following day: return to full activity including work.  DIET:  Eat and drink normally unless instructed otherwise.     TREATMENT FOR COMMON SIDE EFFECTS:  - Mild abdominal pain, nausea, belching, bloating or excessive gas:  rest,   eat lightly and use a heating pad.  - Sore Throat: treat with throat lozenges and/or gargle with warm salt   water.  - Because air was used during the procedure, expelling large amounts of air   from your rectum or belching is normal.  - If a bowel prep was taken, you may not have a bowel movement for 1-3 days.    This is normal.  SYMPTOMS TO WATCH FOR AND REPORT TO YOUR PHYSICIAN:  1. Abdominal pain or bloating, other than gas cramps.  2. Chest pain.  3. Back pain.  4. Signs of infection such as: chills or fever occurring within 24 hours   after the procedure.  5. Rectal bleeding, which would show as bright red, maroon, or black stools.   (A tablespoon of blood from the rectum is not serious, especially  if   hemorrhoids are present.)  6. Vomiting.  7. Weakness or dizziness.  GO DIRECTLY TO THE NEAREST EMERGENCY ROOM IF YOU HAVE ANY OF THE FOLLOWING:      Difficulty breathing              Chills and/or fever over 101 F   Persistent vomiting and/or vomiting blood   Severe abdominal pain   Severe chest pain   Black, tarry stools   Bleeding- more than one tablespoon   Any other symptom or condition that you feel may need urgent attention  Your doctor recommends these additional instructions:  If any biopsies were taken, your doctors clinic will contact you in 1 to 2   weeks with any results.  - Discharge patient to home (ambulatory).   - Watch for pancreatitis, bleeding, perforation, and cholangitis.   - Observe patient's clinical course.   - Cipro (ciprofloxacin) 500 mg PO BID for 5 days.  For questions, problems or results please call your physician - Brenden Montero MD at Work:  [phone].  [facility] [place], EMERGENCY ROOM PHONE NUMBER: [phone]  IF A COMPLICATION OR EMERGENCY SITUATION ARISES AND YOU ARE UNABLE TO REACH   YOUR PHYSICIAN - GO DIRECTLY TO THE EMERGENCY ROOM.  Brenden Montero MD  9/21/2023 3:28:55 PM  This report has been verified and signed electronically.  Dear patient,  As a result of recent federal legislation (The Federal Cures Act), you may   receive lab or pathology results from your procedure in your [facility]   account before your physician is able to contact you. Your physician or   their representative will relay the results to you with their   recommendations at their soonest availability.  Thank you,  [employer]ATION

## 2023-09-21 NOTE — BRIEF OP NOTE
Discharge Summary/Instructions after an Endoscopic Procedure    Patient Name: Paul Scott  Patient MRN: [id number]  Patient YOB: 1980 Thursday, September 21, 2023  Brenden Montero MD    Dear patient,  As a result of recent federal legislation (The Federal Cures Act), you may receive lab or pathology results from your procedure in your [facility] account before your physician is able to contact you. Your physician or their representative will relay the results to you with their recommendations at their soonest availability.  Thank you,    RESTRICTIONS:  During your procedure today, you received medications for sedation.  These medications may affect your judgment, balance and coordination.  Therefore, for 24 hours, you have the following restrictions:     - DO NOT drive a car, operate machinery, make legal/financial decisions, sign important papers or drink alcohol.      ACTIVITY:  Today: no heavy lifting, straining or running due to procedural sedation/anesthesia.  The following day: return to full activity including work.    DIET:  Eat and drink normally unless instructed otherwise.     TREATMENT FOR COMMON SIDE EFFECTS:  - Mild abdominal pain, nausea, belching, bloating or excessive gas:  rest, eat lightly and use a heating pad.  - Sore Throat: treat with throat lozenges and/or gargle with warm salt water.  - Because air was used during the procedure, expelling large amounts of air from your rectum or belching is normal.  - If a bowel prep was taken, you may not have a bowel movement for 1-3 days.  This is normal.      SYMPTOMS TO WATCH FOR AND REPORT TO YOUR PHYSICIAN:  1. Abdominal pain or bloating, other than gas cramps.  2. Chest pain.  3. Back pain.  4. Signs of infection such as: chills or fever occurring within 24 hours after the procedure.  5. Rectal bleeding, which would show as bright red, maroon, or black stools. (A tablespoon of blood from the rectum is not serious, especially if hemorrhoids  are present.)  6. Vomiting.  7. Weakness or dizziness.      GO DIRECTLY TO THE NEAREST EMERGENCY ROOM IF YOU HAVE ANY OF THE FOLLOWING:     Difficulty breathing              Chills and/or fever over 101 F   Persistent vomiting and/or vomiting blood   Severe abdominal pain   Severe chest pain   Black, tarry stools   Bleeding- more than one tablespoon   Any other symptom or condition that you feel may need urgent attention    Your doctor recommends these additional instructions:  If any biopsies were taken, your doctors clinic will contact you in 1 to 2 weeks with any results.    - Discharge patient to home (ambulatory).   - Watch for pancreatitis, bleeding, perforation, and cholangitis.   - Observe patient's clinical course.   - Cipro (ciprofloxacin) 500 mg PO BID for 5 days.    For questions, problems or results please call your physician - Brenden Montero MD at Work:  [phone].    [facility], EMERGENCY ROOM PHONE NUMBER: [phone]    IF A COMPLICATION OR EMERGENCY SITUATION ARISES AND YOU ARE UNABLE TO REACH YOUR PHYSICIAN - GO DIRECTLY TO THE EMERGENCY ROOM.

## 2023-09-21 NOTE — ANESTHESIA PREPROCEDURE EVALUATION
09/21/2023  Paul Scott Jr. is a 43 y.o., male.      Pre-op Assessment    I have reviewed the Patient Summary Reports.       I have reviewed the Medications.     Review of Systems  Anesthesia Hx:  No problems with previous Anesthesia  History of prior surgery of interest to airway management or planning: Previous anesthesia: General   Social:  Smoker, No Alcohol Use    Hematology/Oncology:  Hematology Normal   Oncology Normal     Cardiovascular:  Cardiovascular Normal Exercise tolerance: good     Pulmonary:   Asthma    Renal/:  Renal/ Normal     Hepatic/GI:   Liver Disease, Hepatitis, C Cirrhosis   Musculoskeletal:  Musculoskeletal Normal    Neurological:  Neurology Normal    Endocrine:  Endocrine Normal    Dermatological:  Skin Normal    Psych:  Psychiatric Normal           Physical Exam  General: Well nourished, Cooperative, Alert and Oriented    Airway:  Mallampati: II   Mouth Opening: Normal  TM Distance: Normal  Tongue: Normal  Neck ROM: Normal ROM    Dental:        Anesthesia Plan  Type of Anesthesia, risks & benefits discussed:    Anesthesia Type: Gen Natural Airway  Intra-op Monitoring Plan: Standard ASA Monitors  Post Op Pain Control Plan: multimodal analgesia and IV/PO Opioids PRN  Induction:  IV  Airway Plan: , Post-Induction  Informed Consent: Informed consent signed with the Patient and all parties understand the risks and agree with anesthesia plan.  All questions answered.   ASA Score: 3    Ready For Surgery From Anesthesia Perspective.     .

## 2023-09-21 NOTE — PLAN OF CARE
Discharge instructions given and explained to patient and family with verbalized understanding. Patients V/S stable, denies N/V, tolerating PO fluids, rates pain level as tolerable, IV DC'd cath intact.Pt left floor via W/C, stable for transport, responsible person available for transport home.

## 2023-09-22 ENCOUNTER — TELEPHONE (OUTPATIENT)
Dept: HEPATOLOGY | Facility: CLINIC | Age: 43
End: 2023-09-22
Payer: MEDICAID

## 2023-09-22 NOTE — ANESTHESIA POSTPROCEDURE EVALUATION
Anesthesia Post Evaluation    Patient: Paul Scott JrNicole    Procedure(s) Performed: Procedure(s) (LRB):  ERCP (ENDOSCOPIC RETROGRADE CHOLANGIOPANCREATOGRAPHY) (N/A)    Final Anesthesia Type: general      Patient location during evaluation: PACU  Patient participation: Yes- Able to Participate  Level of consciousness: awake and alert  Post-procedure vital signs: reviewed and stable  Pain management: adequate  Airway patency: patent    PONV status at discharge: No PONV  Anesthetic complications: no      Cardiovascular status: blood pressure returned to baseline  Respiratory status: unassisted  Hydration status: euvolemic  Follow-up not needed.          Vitals Value Taken Time   /62 09/21/23 1545   Temp 36.7 °C (98.1 °F) 09/21/23 1545   Pulse 74 09/21/23 1545   Resp 20 09/21/23 1545   SpO2 97 % 09/21/23 1545         No case tracking events are documented in the log.      Pain/Jarrell Score: Jarrell Score: 10 (9/21/2023  3:30 PM)

## 2023-09-22 NOTE — TELEPHONE ENCOUNTER
Attempt made to reach patient /daughter Corrine for scheduling.  Msg from PA Scheuermann left on VM and mailed to them.  I stressed that they call back for scheduling.

## 2023-09-22 NOTE — TELEPHONE ENCOUNTER
9/21/23 ERCP results reviewed - stent removed    Chart review:  Pt missed Sept labs, u/s   Staff has tried calling him to r/s w/o success  (Needs CBC, CMP, INR, AFP, HCV RNA, HBV DNA, U/S)    If unable to reach pt or his daughter by phone, please mail this to him:      Mr Scott   It is time for labs to check and see if your Hepatitis C infection is cured.  It is also time for labs and ultrasound to monitor how the liver is working and do liver cancer screening.It is very important these tests be done. Please contact our office so we an help you reschedule the appointments you recently missed.    Jen Scheuermann, PA-C  Hep[facility] - [facility]

## 2023-10-26 ENCOUNTER — TELEPHONE (OUTPATIENT)
Dept: HEPATOLOGY | Facility: CLINIC | Age: 43
End: 2023-10-26
Payer: MEDICAID

## 2023-10-26 PROBLEM — R76.8 HEPATITIS C ANTIBODY TEST POSITIVE: Status: RESOLVED | Noted: 2023-01-31 | Resolved: 2023-10-26

## 2023-10-26 NOTE — TELEPHONE ENCOUNTER
----- Message from Clau Pena RN sent at 10/24/2023  2:01 PM CDT -----  Multiple attempts made to coordinate svr 12 draw and hcc testing.  Okay to close episode?